# Patient Record
Sex: FEMALE | Race: BLACK OR AFRICAN AMERICAN | NOT HISPANIC OR LATINO | Employment: UNEMPLOYED | URBAN - METROPOLITAN AREA
[De-identification: names, ages, dates, MRNs, and addresses within clinical notes are randomized per-mention and may not be internally consistent; named-entity substitution may affect disease eponyms.]

---

## 2019-10-16 PROBLEM — I10 ESSENTIAL HYPERTENSION: Status: ACTIVE | Noted: 2019-10-16

## 2019-10-16 PROBLEM — E10.3521: Status: ACTIVE | Noted: 2019-10-16

## 2019-10-16 PROBLEM — Z21 ASYMPTOMATIC HIV INFECTION: Status: ACTIVE | Noted: 2019-10-16

## 2019-10-16 PROBLEM — H04.123 DRY EYE SYNDROME OF BOTH EYES: Status: ACTIVE | Noted: 2019-10-16

## 2019-11-08 PROBLEM — J81.0 ACUTE PULMONARY EDEMA: Status: ACTIVE | Noted: 2019-11-08

## 2019-11-08 PROBLEM — I77.0 ARTERIOVENOUS FISTULA: Status: ACTIVE | Noted: 2019-11-08

## 2019-11-08 PROBLEM — N18.6 END STAGE RENAL DISEASE: Status: ACTIVE | Noted: 2017-11-12

## 2019-11-08 PROBLEM — T80.219A CENTRAL LINE INFECTION: Status: ACTIVE | Noted: 2017-11-12

## 2019-11-08 PROBLEM — J18.9 CAP (COMMUNITY ACQUIRED PNEUMONIA): Status: ACTIVE | Noted: 2019-11-05

## 2020-02-10 PROBLEM — J81.0 ACUTE PULMONARY EDEMA: Status: RESOLVED | Noted: 2019-11-08 | Resolved: 2020-02-10

## 2020-04-01 PROBLEM — R87.610 ASCUS WITH POSITIVE HIGH RISK HPV CERVICAL: Status: ACTIVE | Noted: 2019-12-18

## 2020-04-01 PROBLEM — R87.810 ASCUS WITH POSITIVE HIGH RISK HPV CERVICAL: Status: ACTIVE | Noted: 2019-12-18

## 2020-07-29 PROBLEM — E11.69 TYPE 2 DIABETES MELLITUS WITH OTHER SPECIFIED COMPLICATION: Status: ACTIVE | Noted: 2020-07-29

## 2020-07-29 PROBLEM — Z99.2 HEMODIALYSIS PATIENT: Status: ACTIVE | Noted: 2020-07-29

## 2020-07-29 PROBLEM — H54.62 VISION LOSS OF LEFT EYE: Status: ACTIVE | Noted: 2020-07-29

## 2020-07-29 PROBLEM — B20 HIV INFECTION: Status: ACTIVE | Noted: 2017-11-12

## 2020-07-29 PROBLEM — Z12.4 PAP SMEAR FOR CERVICAL CANCER SCREENING: Status: ACTIVE | Noted: 2020-07-29

## 2020-07-29 PROBLEM — Z21 HIV INFECTION: Status: ACTIVE | Noted: 2017-11-12

## 2020-07-29 PROBLEM — F32.A DEPRESSION: Status: ACTIVE | Noted: 2020-07-29

## 2020-09-14 PROBLEM — T80.219A CENTRAL LINE INFECTION: Status: RESOLVED | Noted: 2017-11-12 | Resolved: 2020-09-14

## 2020-09-14 PROBLEM — Z12.4 PAP SMEAR FOR CERVICAL CANCER SCREENING: Status: RESOLVED | Noted: 2020-07-29 | Resolved: 2020-09-14

## 2020-09-14 PROBLEM — Z98.890 STATUS POST COLPOSCOPY: Status: ACTIVE | Noted: 2020-09-14

## 2020-11-11 PROBLEM — M25.571 RIGHT ANKLE PAIN: Status: ACTIVE | Noted: 2020-11-11

## 2020-11-11 PROBLEM — R06.02 SOB (SHORTNESS OF BREATH): Status: ACTIVE | Noted: 2020-11-11

## 2021-07-09 ENCOUNTER — PATIENT OUTREACH (OUTPATIENT)
Dept: ADMINISTRATIVE | Facility: HOSPITAL | Age: 31
End: 2021-07-09

## 2021-07-09 DIAGNOSIS — E11.69 TYPE 2 DIABETES MELLITUS WITH OTHER SPECIFIED COMPLICATION, UNSPECIFIED WHETHER LONG TERM INSULIN USE: Primary | ICD-10-CM

## 2021-07-20 PROBLEM — N18.6 TYPE 2 DIABETES MELLITUS WITH CHRONIC KIDNEY DISEASE ON CHRONIC DIALYSIS, WITH LONG-TERM CURRENT USE OF INSULIN: Status: ACTIVE | Noted: 2020-07-29

## 2021-07-20 PROBLEM — Z79.4 TYPE 2 DIABETES MELLITUS WITH CHRONIC KIDNEY DISEASE ON CHRONIC DIALYSIS, WITH LONG-TERM CURRENT USE OF INSULIN: Status: ACTIVE | Noted: 2020-07-29

## 2021-07-20 PROBLEM — Z99.2 TYPE 2 DIABETES MELLITUS WITH CHRONIC KIDNEY DISEASE ON CHRONIC DIALYSIS, WITH LONG-TERM CURRENT USE OF INSULIN: Status: ACTIVE | Noted: 2020-07-29

## 2021-07-20 PROBLEM — E11.42 DIABETIC POLYNEUROPATHY ASSOCIATED WITH TYPE 2 DIABETES MELLITUS: Status: ACTIVE | Noted: 2021-07-20

## 2021-07-20 PROBLEM — E11.22 TYPE 2 DIABETES MELLITUS WITH CHRONIC KIDNEY DISEASE ON CHRONIC DIALYSIS, WITH LONG-TERM CURRENT USE OF INSULIN: Status: ACTIVE | Noted: 2020-07-29

## 2021-12-14 PROBLEM — R87.810 ASCUS WITH POSITIVE HIGH RISK HPV CERVICAL: Status: RESOLVED | Noted: 2019-12-18 | Resolved: 2021-12-14

## 2021-12-14 PROBLEM — R87.612 LGSIL ON PAP SMEAR OF CERVIX: Status: ACTIVE | Noted: 2021-12-14

## 2021-12-14 PROBLEM — R87.610 ASCUS WITH POSITIVE HIGH RISK HPV CERVICAL: Status: RESOLVED | Noted: 2019-12-18 | Resolved: 2021-12-14

## 2021-12-18 PROBLEM — N87.0 CIN I (CERVICAL INTRAEPITHELIAL NEOPLASIA I): Status: ACTIVE | Noted: 2021-12-18

## 2022-01-06 PROBLEM — U07.1 PNEUMONIA DUE TO COVID-19 VIRUS: Status: ACTIVE | Noted: 2022-01-06

## 2022-01-06 PROBLEM — E87.5 HYPERKALEMIA: Status: ACTIVE | Noted: 2022-01-06

## 2022-01-06 PROBLEM — N25.81 SECONDARY HYPERPARATHYROIDISM: Status: ACTIVE | Noted: 2022-01-06

## 2022-01-06 PROBLEM — J12.82 PNEUMONIA DUE TO COVID-19 VIRUS: Status: ACTIVE | Noted: 2022-01-06

## 2022-01-06 PROBLEM — J12.82 PNEUMONIA DUE TO COVID-19 VIRUS: Status: RESOLVED | Noted: 2022-01-06 | Resolved: 2022-01-06

## 2022-01-06 PROBLEM — U07.1 PNEUMONIA DUE TO COVID-19 VIRUS: Status: RESOLVED | Noted: 2022-01-06 | Resolved: 2022-01-06

## 2022-01-06 PROBLEM — I16.0 HYPERTENSIVE URGENCY: Status: ACTIVE | Noted: 2022-01-06

## 2022-01-06 PROBLEM — J96.01 ACUTE HYPOXEMIC RESPIRATORY FAILURE: Status: ACTIVE | Noted: 2022-01-06

## 2022-01-06 PROBLEM — E87.1 HYPONATREMIA: Status: ACTIVE | Noted: 2022-01-06

## 2022-01-06 PROBLEM — E66.09 CLASS 1 OBESITY DUE TO EXCESS CALORIES WITH SERIOUS COMORBIDITY AND BODY MASS INDEX (BMI) OF 32.0 TO 32.9 IN ADULT: Status: ACTIVE | Noted: 2022-01-06

## 2022-01-06 PROBLEM — E66.811 CLASS 1 OBESITY DUE TO EXCESS CALORIES WITH SERIOUS COMORBIDITY AND BODY MASS INDEX (BMI) OF 32.0 TO 32.9 IN ADULT: Status: ACTIVE | Noted: 2022-01-06

## 2022-01-08 PROBLEM — M54.9 BACK PAIN: Status: ACTIVE | Noted: 2022-01-08

## 2022-01-10 ENCOUNTER — PATIENT MESSAGE (OUTPATIENT)
Dept: ADMINISTRATIVE | Facility: CLINIC | Age: 32
End: 2022-01-10

## 2022-01-11 ENCOUNTER — NURSE TRIAGE (OUTPATIENT)
Dept: ADMINISTRATIVE | Facility: CLINIC | Age: 32
End: 2022-01-11

## 2022-01-11 ENCOUNTER — TELEPHONE (OUTPATIENT)
Dept: ADMINISTRATIVE | Facility: CLINIC | Age: 32
End: 2022-01-11

## 2022-01-11 ENCOUNTER — PATIENT MESSAGE (OUTPATIENT)
Dept: ADMINISTRATIVE | Facility: OTHER | Age: 32
End: 2022-01-11

## 2022-01-11 NOTE — TELEPHONE ENCOUNTER
Per Covid Surveillance Program, pt escalated for abnormal symptoms; SOB 2/5 at rest.  SOB rated 0/5 with activity so she may have entered the readings incorrectly.  She states that she did enter it incorrectly.  She reports that she has been coughing for longer than 3 weeks.  Denies CP and fever.  SOB 2/5 with activity.  Per protocol, care advised is JIMMY will callback.  Warm handoff to JIMMY for further management.  ANABELA Baptiste PA-C notified via SecureChat.  Instructed to call OOC for worsening/questions/concerns.  VU.    Reason for Disposition   Cough present > 3 weeks    Additional Information   Negative: SEVERE difficulty breathing (e.g., struggling for each breath, speaks in single words)   Negative: Difficult to awaken or acting confused (e.g., disoriented, slurred speech)   Negative: Bluish (or gray) lips or face now   Negative: Shock suspected (e.g., cold/pale/clammy skin, too weak to stand, low BP, rapid pulse)   Negative: Sounds like a life-threatening emergency to the triager   Negative: SEVERE or constant chest pain or pressure (Exception: mild central chest pain, present only when coughing)   Negative: MODERATE difficulty breathing (e.g., speaks in phrases, SOB even at rest, pulse 100-120)   Negative: Headache and stiff neck (can't touch chin to chest)   Negative: MILD difficulty breathing (e.g., minimal/no SOB at rest, SOB with walking, pulse <100)   Negative: Chest pain or pressure   Negative: Patient sounds very sick or weak to the triager   Negative: Fever > 103 F (39.4 C)   Negative: [1] Fever > 101 F (38.3 C) AND [2] over 60 years of age   Negative: [1] Fever > 100.0 F (37.8 C) AND [2] bedridden (e.g., nursing home patient, CVA, chronic illness, recovering from surgery)   Negative: HIGH RISK for severe COVID complications (e.g., age > 64 years, obesity with BMI > 25, pregnant, chronic lung disease or other chronic medical condition) (Exception: Already seen by PCP and no new or  worsening symptoms.)   Negative: [1] HIGH RISK patient AND [2] influenza is widespread in the community AND [3] ONE OR MORE respiratory symptoms: cough, sore throat, runny or stuffy nose   Negative: [1] HIGH RISK patient AND [2] influenza exposure within the last 7 days AND [3] ONE OR MORE respiratory symptoms: cough, sore throat, runny or stuffy nose   Negative: [1] COVID-19 infection suspected by caller or triager AND [2] mild symptoms (cough, fever, or others) AND [3] negative COVID-19 rapid test   Negative: Fever present > 3 days (72 hours)   Negative: [1] Fever returns after gone for over 24 hours AND [2] symptoms worse or not improved   Negative: [1] Continuous (nonstop) coughing interferes with work or school AND [2] no improvement using cough treatment per protocol    Protocols used: CORONAVIRUS (COVID-19) DIAGNOSED OR [id number]

## 2022-01-12 ENCOUNTER — PATIENT MESSAGE (OUTPATIENT)
Dept: ADMINISTRATIVE | Facility: OTHER | Age: 32
End: 2022-01-12

## 2022-01-13 ENCOUNTER — PATIENT MESSAGE (OUTPATIENT)
Dept: ADMINISTRATIVE | Facility: OTHER | Age: 32
End: 2022-01-13

## 2022-01-13 ENCOUNTER — TELEPHONE (OUTPATIENT)
Dept: HOME HEALTH SERVICES | Facility: CLINIC | Age: 32
End: 2022-01-13

## 2022-01-13 ENCOUNTER — PATIENT MESSAGE (OUTPATIENT)
Dept: ADMINISTRATIVE | Facility: CLINIC | Age: 32
End: 2022-01-13

## 2022-01-13 ENCOUNTER — NURSE TRIAGE (OUTPATIENT)
Dept: ADMINISTRATIVE | Facility: CLINIC | Age: 32
End: 2022-01-13

## 2022-01-13 NOTE — TELEPHONE ENCOUNTER
Pt escalated due to reporting o2 sat of 94%, upon retake o2 96% level noted to be, pt also reported 3/5 SOB with activity Reports having back pain states use of tylenol, and lidocaine patch with no relief. Li Lebron NP aware, and will call pt.    Reason for Disposition   MILD difficulty breathing (e.g., minimal/no SOB at rest, SOB with walking, pulse <100)    Additional Information   Negative: SEVERE difficulty breathing (e.g., struggling for each breath, speaks in single words)   Negative: Bluish (or gray) lips or face now   Negative: Difficult to awaken or acting confused (e.g., disoriented, slurred speech)   Negative: Shock suspected (e.g., cold/pale/clammy skin, too weak to stand, low BP, rapid pulse)   Negative: Sounds like a life-threatening emergency to the triager   Negative: SEVERE or constant chest pain or pressure (Exception: mild central chest pain, present only when coughing)   Negative: MODERATE difficulty breathing (e.g., speaks in phrases, SOB even at rest, pulse 100-120)   Negative: Headache and stiff neck (can't touch chin to chest)    Protocols used: CORONAVIRUS (COVID-19) DIAGNOSED OR [id number]

## 2022-01-13 NOTE — TELEPHONE ENCOUNTER
"Called patient due to RN escalation in COVID Surveillance program. Pt escalated due to "reporting o2 sat of 94%, upon retake o2 96% level noted to be, pt also reported 3/5 SOB with activity. Reports dizziness this morning as well. States having back pain states use of tylenol, and lidocaine patch with no relief."    Patient location: [place], [place]    Vitals: Sp02 : 94%. P: 93. Temp: NA  Ambulatory Sp02 on the phone (if applicable): 96%    31 y.o. female with pertinent PMHx ESRD on dialysis, DM2, HTN, grade 1 cervical neoplasia s/p colopscopy, HIV  on day 13 of Covid symptoms. Positive Covid screen on 1/1/22 and 1/6/22. CXR on 1/6/22. Home oxygen: no. COVID-19 Hospitalization History: 1/8-1/10/22. Received antibody infusion: no. Fully vaccinated: no. Remdesivir treatment day: 1/7/22 and 1/11/22. SpO2 goal on hospital discharge: 93%.    HPI: Feeling weaker than usual today. Has not been drinking fluids because she is on dialysis and limits PO liquids. She has a dialysis appt in 1 hour.     ROS: Denies worsening cough, light headedness, fever, chills, diaphoresis, chest pain, abdominal pain, emesis, diarrhea or further symptoms.     Assessment: Vitals appear WNL. During phone call, patient appears alert and oriented. Able to speak in full sentences without difficulty. No audible wheezing heard during call.     Plan: Go to dialysis as scheduled. If weakness worsens. Go to ER. She agrees.     Reviewed with patient the reasons for seeking emergency care. Pt aware that if Sp02 <94% or if they have any worsening symptoms, they need to go to the emergency department. If they are having a medical emergency, they will call 911. Otherwise, patient will continue to submit data as scheduled. Reviewed importance of wearing mask if self or family members leave the house.     Advised next steps: Continue care at home, continue scheduled dialysis treatment.   "

## 2022-01-14 ENCOUNTER — PATIENT MESSAGE (OUTPATIENT)
Dept: ADMINISTRATIVE | Facility: OTHER | Age: 32
End: 2022-01-14

## 2022-01-15 ENCOUNTER — PATIENT MESSAGE (OUTPATIENT)
Dept: ADMINISTRATIVE | Facility: OTHER | Age: 32
End: 2022-01-15

## 2022-01-15 ENCOUNTER — PATIENT MESSAGE (OUTPATIENT)
Dept: ADMINISTRATIVE | Facility: CLINIC | Age: 32
End: 2022-01-15

## 2022-01-16 ENCOUNTER — PATIENT MESSAGE (OUTPATIENT)
Dept: ADMINISTRATIVE | Facility: OTHER | Age: 32
End: 2022-01-16

## 2022-01-17 ENCOUNTER — PATIENT MESSAGE (OUTPATIENT)
Dept: ADMINISTRATIVE | Facility: OTHER | Age: 32
End: 2022-01-17

## 2022-01-18 ENCOUNTER — PATIENT MESSAGE (OUTPATIENT)
Dept: ADMINISTRATIVE | Facility: OTHER | Age: 32
End: 2022-01-18

## 2022-01-18 ENCOUNTER — PATIENT MESSAGE (OUTPATIENT)
Dept: ADMINISTRATIVE | Facility: CLINIC | Age: 32
End: 2022-01-18

## 2022-01-19 ENCOUNTER — PATIENT MESSAGE (OUTPATIENT)
Dept: ADMINISTRATIVE | Facility: OTHER | Age: 32
End: 2022-01-19

## 2022-01-20 ENCOUNTER — PATIENT MESSAGE (OUTPATIENT)
Dept: ADMINISTRATIVE | Facility: OTHER | Age: 32
End: 2022-01-20

## 2022-01-21 ENCOUNTER — NURSE TRIAGE (OUTPATIENT)
Dept: ADMINISTRATIVE | Facility: CLINIC | Age: 32
End: 2022-01-21

## 2022-01-21 ENCOUNTER — PATIENT MESSAGE (OUTPATIENT)
Dept: ADMINISTRATIVE | Facility: OTHER | Age: 32
End: 2022-01-21

## 2022-01-21 ENCOUNTER — PATIENT MESSAGE (OUTPATIENT)
Dept: ADMINISTRATIVE | Facility: CLINIC | Age: 32
End: 2022-01-21

## 2022-01-21 NOTE — TELEPHONE ENCOUNTER
Received medium Priority Escalation via COVID Surveillance Program. Patient submitted the following abnormal data 2/5 SOB at rest.  Patient called. Patient reports the following her symptoms are not CV related - states they are not any worse- states she is due for dialysis tomorrow at 4am and that it is her norm to get some SOB before her treatments are due (3 days a week- gets some SOB M,W,F cause her treatments are Tu, TH, SAT). States CV symptoms are doing well and that she retested and is waiting for result. Speaking in complete sentences without difficulty. No audible wheezes noted. Advised to call back with concerns or worsening symptoms. Verbalized understanding.     Reason for Disposition   Information only question and nurse able to answer    Additional Information   Negative: Nursing judgment   Negative: Nursing judgment   Negative: Nursing judgment   Negative: Nursing judgment    Protocols used: INFORMATION ONLY CALL - NO TRIAGE-A-OH

## 2022-01-22 ENCOUNTER — PATIENT MESSAGE (OUTPATIENT)
Dept: ADMINISTRATIVE | Facility: OTHER | Age: 32
End: 2022-01-22

## 2022-01-23 ENCOUNTER — PATIENT MESSAGE (OUTPATIENT)
Dept: ADMINISTRATIVE | Facility: CLINIC | Age: 32
End: 2022-01-23

## 2022-01-24 ENCOUNTER — PATIENT MESSAGE (OUTPATIENT)
Dept: ADMINISTRATIVE | Facility: CLINIC | Age: 32
End: 2022-01-24

## 2022-01-24 PROBLEM — R10.9 FLANK PAIN: Status: ACTIVE | Noted: 2022-01-24

## 2022-01-25 PROBLEM — R06.02 SHORTNESS OF BREATH: Status: ACTIVE | Noted: 2022-01-25

## 2023-01-05 ENCOUNTER — TELEPHONE (OUTPATIENT)
Dept: TRANSPLANT | Facility: CLINIC | Age: 33
End: 2023-01-05

## 2023-01-06 ENCOUNTER — TELEPHONE (OUTPATIENT)
Dept: TRANSPLANT | Facility: CLINIC | Age: 33
End: 2023-01-06

## 2023-01-06 ENCOUNTER — DOCUMENTATION ONLY (OUTPATIENT)
Dept: TRANSPLANT | Facility: CLINIC | Age: 33
End: 2023-01-06

## 2023-01-06 NOTE — TELEPHONE ENCOUNTER
Contacted patient to review initial intake information. Patient reports the followin. Can you walk up a flight of stairs without getting short of breath or stopping? Yes   2. Can you walk one block without getting short of breath or having to stop? No   3. Do you use oxygen? No   4. Do you use a cane, walker, or wheel chair to assist in mobility? No   5. Have you been hospitalized or had recent surgery in the last 6 months? No    A. Stroke   B. Heart surgery or heart catheterization   C. Broken bone  6. Do you have any cuts, open sores (ulcers), or wounds anywhere on your body? No   7. Do you go to dialysis? Yes What days? T,T,S  8. Preferred appointment day? Weds  9. Caregiver? Boyfriend     Patient is aware the next steps will include completing records and compliance verification. Patient is aware once provider review and insurance authorization is received we will contact patient to schedule initial visit. Patient is aware that initial visit will begin prior to / at 7 am and will conclude at approximately 3 pm on date of appointment. All questions answered at this time.

## 2023-02-02 ENCOUNTER — TELEPHONE (OUTPATIENT)
Dept: TRANSPLANT | Facility: CLINIC | Age: 33
End: 2023-02-02

## 2023-02-02 NOTE — TELEPHONE ENCOUNTER
Returned call, reviewed denial letter with Sydnee. Sydnee verbalized understanding.     ----- Message from Asya Israel sent at 2/2/2023  1:40 PM CST -----  Regarding: status decline info  The KRISTY Randhawa  called from [facility]  asking to get more info regarding the patients declined status for transplant    No further information provided      Patient can be contacted @# [phone] ext [phone] (Sydnee)

## 2023-02-03 PROBLEM — Z78.9 PROBLEM WITH VASCULAR ACCESS: Status: ACTIVE | Noted: 2023-02-03

## 2023-04-26 PROBLEM — M72.6 NECROTIZING FASCIITIS: Status: ACTIVE | Noted: 2023-04-26

## 2023-04-26 PROBLEM — R00.0 TACHYCARDIA: Status: ACTIVE | Noted: 2023-04-26

## 2023-04-26 PROBLEM — E11.9 TYPE 2 DIABETES MELLITUS WITHOUT COMPLICATION, WITHOUT LONG-TERM CURRENT USE OF INSULIN: Status: ACTIVE | Noted: 2020-07-29

## 2023-04-27 PROBLEM — R65.20 SEPSIS WITH ACUTE ORGAN DYSFUNCTION: Status: ACTIVE | Noted: 2023-04-27

## 2023-04-27 PROBLEM — I95.9 HYPOTENSION: Status: ACTIVE | Noted: 2023-04-27

## 2023-04-27 PROBLEM — E87.6 HYPOKALEMIA: Status: ACTIVE | Noted: 2023-04-27

## 2023-04-27 PROBLEM — E87.29 HIGH ANION GAP METABOLIC ACIDOSIS: Status: ACTIVE | Noted: 2023-04-27

## 2023-04-27 PROBLEM — R50.9 FEVER: Status: ACTIVE | Noted: 2023-04-27

## 2023-04-27 PROBLEM — M79.89 RIGHT LEG SWELLING: Status: ACTIVE | Noted: 2023-04-27

## 2023-04-27 PROBLEM — A41.9 SEPSIS: Status: ACTIVE | Noted: 2023-04-27

## 2023-04-29 PROBLEM — A49.01 STAPHYLOCOCCUS AUREUS INFECTION: Status: ACTIVE | Noted: 2023-04-29

## 2023-04-30 PROBLEM — B95.61 MSSA BACTEREMIA: Status: ACTIVE | Noted: 2023-04-30

## 2023-04-30 PROBLEM — R78.81 MSSA BACTEREMIA: Status: ACTIVE | Noted: 2023-04-30

## 2023-05-01 PROBLEM — E87.6 HYPOKALEMIA: Status: RESOLVED | Noted: 2023-04-27 | Resolved: 2023-05-01

## 2023-05-01 PROBLEM — R00.0 SINUS TACHYCARDIA: Status: RESOLVED | Noted: 2023-04-26 | Resolved: 2023-05-01

## 2023-05-01 PROBLEM — A41.9 SEPSIS: Status: ACTIVE | Noted: 2023-05-01

## 2023-05-01 PROBLEM — E87.1 HYPONATREMIA: Status: RESOLVED | Noted: 2022-01-06 | Resolved: 2023-05-01

## 2023-05-01 PROBLEM — A41.01 SEPSIS DUE TO METHICILLIN SUSCEPTIBLE STAPHYLOCOCCUS AUREUS (MSSA) WITHOUT ACUTE ORGAN DYSFUNCTION: Status: ACTIVE | Noted: 2023-04-27

## 2023-05-01 PROBLEM — I95.9 HYPOTENSION: Status: RESOLVED | Noted: 2023-04-27 | Resolved: 2023-05-01

## 2023-05-01 PROBLEM — R50.9 FEVER: Status: RESOLVED | Noted: 2023-04-27 | Resolved: 2023-05-01

## 2023-05-03 PROBLEM — E11.22 TYPE 2 DIABETES MELLITUS WITH CHRONIC KIDNEY DISEASE ON CHRONIC DIALYSIS, WITH LONG-TERM CURRENT USE OF INSULIN: Status: ACTIVE | Noted: 2023-05-03

## 2023-05-03 PROBLEM — A41.01 SEPSIS DUE TO METHICILLIN SUSCEPTIBLE STAPHYLOCOCCUS AUREUS (MSSA) WITHOUT ACUTE ORGAN DYSFUNCTION: Status: RESOLVED | Noted: 2023-04-27 | Resolved: 2023-05-03

## 2023-05-03 PROBLEM — N18.6 TYPE 2 DIABETES MELLITUS WITH CHRONIC KIDNEY DISEASE ON CHRONIC DIALYSIS, WITH LONG-TERM CURRENT USE OF INSULIN: Status: ACTIVE | Noted: 2023-05-03

## 2023-05-03 PROBLEM — Z79.4 TYPE 2 DIABETES MELLITUS WITH CHRONIC KIDNEY DISEASE ON CHRONIC DIALYSIS, WITH LONG-TERM CURRENT USE OF INSULIN: Status: ACTIVE | Noted: 2023-05-03

## 2023-05-03 PROBLEM — Z99.2 TYPE 2 DIABETES MELLITUS WITH CHRONIC KIDNEY DISEASE ON CHRONIC DIALYSIS, WITH LONG-TERM CURRENT USE OF INSULIN: Status: ACTIVE | Noted: 2023-05-03

## 2023-05-03 PROBLEM — A41.9 SEPSIS: Status: RESOLVED | Noted: 2023-05-01 | Resolved: 2023-05-03

## 2023-05-03 PROBLEM — R00.0 TACHYCARDIA: Status: RESOLVED | Noted: 2023-04-26 | Resolved: 2023-05-03

## 2023-05-03 PROBLEM — E87.29 HIGH ANION GAP METABOLIC ACIDOSIS: Status: RESOLVED | Noted: 2023-04-27 | Resolved: 2023-05-03

## 2023-05-05 PROBLEM — I46.9 CARDIAC ARREST: Status: ACTIVE | Noted: 2023-05-05

## 2023-05-11 PROBLEM — T40.601A OPIATE OVERDOSE: Status: ACTIVE | Noted: 2023-05-11

## 2023-05-11 PROBLEM — R41.82 ALTERED MENTAL STATUS: Status: ACTIVE | Noted: 2023-05-11

## 2023-05-11 PROBLEM — G93.40 ACUTE ENCEPHALOPATHY: Status: ACTIVE | Noted: 2023-05-11

## 2023-05-11 PROBLEM — N39.0 UTI (URINARY TRACT INFECTION): Status: ACTIVE | Noted: 2023-05-11

## 2023-05-12 PROBLEM — Z79.899 POLYPHARMACY: Status: ACTIVE | Noted: 2023-05-12

## 2023-05-16 ENCOUNTER — PATIENT OUTREACH (OUTPATIENT)
Dept: ADMINISTRATIVE | Facility: CLINIC | Age: 33
End: 2023-05-16

## 2023-08-14 PROBLEM — N39.0 UTI (URINARY TRACT INFECTION): Status: RESOLVED | Noted: 2023-05-11 | Resolved: 2023-08-14

## 2023-11-30 ENCOUNTER — TELEPHONE (OUTPATIENT)
Dept: TRANSPLANT | Facility: CLINIC | Age: 33
End: 2023-11-30
Payer: MEDICAID

## 2023-12-05 ENCOUNTER — TELEPHONE (OUTPATIENT)
Dept: TRANSPLANT | Facility: CLINIC | Age: 33
End: 2023-12-05
Payer: MEDICAID

## 2023-12-20 ENCOUNTER — TELEPHONE (OUTPATIENT)
Dept: TRANSPLANT | Facility: CLINIC | Age: 33
End: 2023-12-20
Payer: MEDICAID

## 2023-12-21 ENCOUNTER — TELEPHONE (OUTPATIENT)
Dept: TRANSPLANT | Facility: CLINIC | Age: 33
End: 2023-12-21
Payer: MEDICAID

## 2023-12-26 DIAGNOSIS — Z76.82 ORGAN TRANSPLANT CANDIDATE: Primary | ICD-10-CM

## 2024-01-31 ENCOUNTER — TELEPHONE (OUTPATIENT)
Dept: TRANSPLANT | Facility: CLINIC | Age: 34
End: 2024-01-31
Payer: MEDICAID

## 2024-01-31 ENCOUNTER — HOSPITAL ENCOUNTER (OUTPATIENT)
Dept: RADIOLOGY | Facility: HOSPITAL | Age: 34
Discharge: HOME | End: 2024-01-31
Payer: MEDICAID

## 2024-01-31 ENCOUNTER — OFFICE VISIT (OUTPATIENT)
Dept: TRANSPLANT | Facility: CLINIC | Age: 34
End: 2024-01-31
Payer: MEDICAID

## 2024-01-31 ENCOUNTER — HOSPITAL ENCOUNTER (OUTPATIENT)
Dept: RADIOLOGY | Facility: HOSPITAL | Age: 34
Discharge: HOME | End: 2024-01-31
Attending: NURSE PRACTITIONER
Payer: MEDICAID

## 2024-01-31 VITALS
RESPIRATION RATE: 18 BRPM | DIASTOLIC BLOOD PRESSURE: 93 MMHG | HEART RATE: 102 BPM | OXYGEN SATURATION: 99 % | BODY MASS INDEX: 33.28 KG/M2 | TEMPERATURE: 97 F | HEIGHT: 63 IN | WEIGHT: 187.81 LBS | SYSTOLIC BLOOD PRESSURE: 174 MMHG

## 2024-01-31 DIAGNOSIS — Z79.4 DIABETES MELLITUS DUE TO UNDERLYING CONDITION WITH CHRONIC KIDNEY DISEASE ON CHRONIC DIALYSIS, WITH LONG-TERM CURRENT USE OF INSULIN: ICD-10-CM

## 2024-01-31 DIAGNOSIS — E66.09 CLASS 1 OBESITY DUE TO EXCESS CALORIES WITH SERIOUS COMORBIDITY AND BODY MASS INDEX (BMI) OF 32.0 TO 32.9 IN ADULT: ICD-10-CM

## 2024-01-31 DIAGNOSIS — N87.0 CIN I (CERVICAL INTRAEPITHELIAL NEOPLASIA I): ICD-10-CM

## 2024-01-31 DIAGNOSIS — N18.6 ANEMIA DUE TO CHRONIC KIDNEY DISEASE, ON CHRONIC DIALYSIS: ICD-10-CM

## 2024-01-31 DIAGNOSIS — I12.0 TYPE 1 DM WITH HYPERTENSION AND ESRD ON DIALYSIS: ICD-10-CM

## 2024-01-31 DIAGNOSIS — N18.6 DIABETES MELLITUS DUE TO UNDERLYING CONDITION WITH CHRONIC KIDNEY DISEASE ON CHRONIC DIALYSIS, WITH LONG-TERM CURRENT USE OF INSULIN: ICD-10-CM

## 2024-01-31 DIAGNOSIS — Z99.2 ESRD ON HEMODIALYSIS: ICD-10-CM

## 2024-01-31 DIAGNOSIS — N18.6 HYPERPARATHYROIDISM DUE TO ESRD ON DIALYSIS: ICD-10-CM

## 2024-01-31 DIAGNOSIS — E08.22 DIABETES MELLITUS DUE TO UNDERLYING CONDITION WITH CHRONIC KIDNEY DISEASE ON CHRONIC DIALYSIS, WITH LONG-TERM CURRENT USE OF INSULIN: ICD-10-CM

## 2024-01-31 DIAGNOSIS — Z76.82 ORGAN TRANSPLANT CANDIDATE: ICD-10-CM

## 2024-01-31 DIAGNOSIS — D63.1 ANEMIA DUE TO CHRONIC KIDNEY DISEASE, ON CHRONIC DIALYSIS: ICD-10-CM

## 2024-01-31 DIAGNOSIS — I73.9 PVD (PERIPHERAL VASCULAR DISEASE): ICD-10-CM

## 2024-01-31 DIAGNOSIS — N25.81 HYPERPARATHYROIDISM DUE TO ESRD ON DIALYSIS: ICD-10-CM

## 2024-01-31 DIAGNOSIS — B20 CURRENTLY ASYMPTOMATIC HIV INFECTION, WITH HISTORY OF HIV-RELATED ILLNESS: ICD-10-CM

## 2024-01-31 DIAGNOSIS — Z99.2 TYPE 1 DM WITH HYPERTENSION AND ESRD ON DIALYSIS: ICD-10-CM

## 2024-01-31 DIAGNOSIS — Z01.818 PRE-TRANSPLANT EVALUATION FOR CHRONIC KIDNEY DISEASE: Primary | ICD-10-CM

## 2024-01-31 DIAGNOSIS — E10.22 TYPE 1 DM WITH HYPERTENSION AND ESRD ON DIALYSIS: ICD-10-CM

## 2024-01-31 DIAGNOSIS — N18.6 TYPE 1 DM WITH HYPERTENSION AND ESRD ON DIALYSIS: ICD-10-CM

## 2024-01-31 DIAGNOSIS — Z99.2 HYPERPARATHYROIDISM DUE TO ESRD ON DIALYSIS: ICD-10-CM

## 2024-01-31 DIAGNOSIS — Z99.2 DIABETES MELLITUS DUE TO UNDERLYING CONDITION WITH CHRONIC KIDNEY DISEASE ON CHRONIC DIALYSIS, WITH LONG-TERM CURRENT USE OF INSULIN: ICD-10-CM

## 2024-01-31 DIAGNOSIS — Z99.2 ANEMIA DUE TO CHRONIC KIDNEY DISEASE, ON CHRONIC DIALYSIS: ICD-10-CM

## 2024-01-31 DIAGNOSIS — N18.6 ESRD ON HEMODIALYSIS: ICD-10-CM

## 2024-01-31 PROBLEM — M79.89 RIGHT LEG SWELLING: Status: RESOLVED | Noted: 2023-04-27 | Resolved: 2024-01-31

## 2024-01-31 PROBLEM — E11.9 TYPE 2 DIABETES MELLITUS WITHOUT COMPLICATION, WITHOUT LONG-TERM CURRENT USE OF INSULIN: Status: RESOLVED | Noted: 2020-07-29 | Resolved: 2024-01-31

## 2024-01-31 PROBLEM — E11.22: Status: ACTIVE | Noted: 2024-01-31

## 2024-01-31 PROBLEM — E10.9 TYPE 1 DIABETES MELLITUS: Status: ACTIVE | Noted: 2023-05-03

## 2024-01-31 PROBLEM — E10.42 DIABETIC POLYNEUROPATHY ASSOCIATED WITH TYPE 1 DIABETES MELLITUS: Status: ACTIVE | Noted: 2021-07-20

## 2024-01-31 PROCEDURE — 71046 X-RAY EXAM CHEST 2 VIEWS: CPT | Mod: TC,TXP

## 2024-01-31 PROCEDURE — 3008F BODY MASS INDEX DOCD: CPT | Mod: CPTII,TXP,, | Performed by: NURSE PRACTITIONER

## 2024-01-31 PROCEDURE — 99215 OFFICE O/P EST HI 40 MIN: CPT | Mod: PBBFAC,25,TXP | Performed by: NURSE PRACTITIONER

## 2024-01-31 PROCEDURE — 99205 OFFICE O/P NEW HI 60 MIN: CPT | Mod: S$PBB,TXP,, | Performed by: NURSE PRACTITIONER

## 2024-01-31 PROCEDURE — 3046F HEMOGLOBIN A1C LEVEL >9.0%: CPT | Mod: CPTII,TXP,, | Performed by: NURSE PRACTITIONER

## 2024-01-31 PROCEDURE — 76770 US EXAM ABDO BACK WALL COMP: CPT | Mod: 26,TXP,, | Performed by: INTERNAL MEDICINE

## 2024-01-31 PROCEDURE — 3080F DIAST BP >= 90 MM HG: CPT | Mod: CPTII,TXP,, | Performed by: NURSE PRACTITIONER

## 2024-01-31 PROCEDURE — 99204 OFFICE O/P NEW MOD 45 MIN: CPT | Mod: S$PBB,TXP,, | Performed by: TRANSPLANT SURGERY

## 2024-01-31 PROCEDURE — 93978 VASCULAR STUDY: CPT | Mod: TC,TXP

## 2024-01-31 PROCEDURE — 72170 X-RAY EXAM OF PELVIS: CPT | Mod: TC,TXP

## 2024-01-31 PROCEDURE — 3077F SYST BP >= 140 MM HG: CPT | Mod: CPTII,TXP,, | Performed by: NURSE PRACTITIONER

## 2024-01-31 PROCEDURE — 93978 VASCULAR STUDY: CPT | Mod: 26,TXP,, | Performed by: INTERNAL MEDICINE

## 2024-01-31 PROCEDURE — 76770 US EXAM ABDO BACK WALL COMP: CPT | Mod: TC,TXP

## 2024-01-31 PROCEDURE — 72170 X-RAY EXAM OF PELVIS: CPT | Mod: 26,TXP,, | Performed by: RADIOLOGY

## 2024-01-31 PROCEDURE — 1159F MED LIST DOCD IN RCRD: CPT | Mod: CPTII,TXP,, | Performed by: NURSE PRACTITIONER

## 2024-01-31 PROCEDURE — 99999 PR PBB SHADOW E&M-EST. PATIENT-LVL V: CPT | Mod: PBBFAC,TXP,, | Performed by: NURSE PRACTITIONER

## 2024-01-31 PROCEDURE — 71046 X-RAY EXAM CHEST 2 VIEWS: CPT | Mod: 26,TXP,, | Performed by: RADIOLOGY

## 2024-01-31 PROCEDURE — 1160F RVW MEDS BY RX/DR IN RCRD: CPT | Mod: CPTII,TXP,, | Performed by: NURSE PRACTITIONER

## 2024-01-31 RX ORDER — DEXTROMETHORPHAN HYDROBROMIDE, GUAIFENESIN 5; 100 MG/5ML; MG/5ML
650 LIQUID ORAL EVERY 8 HOURS PRN
COMMUNITY

## 2024-01-31 RX ORDER — LACTULOSE 10 G/15ML
10 SOLUTION ORAL; RECTAL DAILY PRN
COMMUNITY
Start: 2023-12-29

## 2024-01-31 RX ORDER — GABAPENTIN 100 MG/1
100 CAPSULE ORAL 3 TIMES DAILY
COMMUNITY
Start: 2024-01-05 | End: 2024-04-05 | Stop reason: ALTCHOICE

## 2024-01-31 NOTE — PROGRESS NOTES
MYCOPHENOLATE REMS PROGRAM:    I reviewed the mycophenolate REMS program with the patient.  Provided the mycophenolate REMS Guide to the patient.  Patient verbalized understanding and had the opportunity to ask questions.

## 2024-01-31 NOTE — LETTER
February 2, 2024        Link Clifford  [address] [facility] [address]  Phone: [phone]  Fax: [phone]             [facility] Transplant 1st Fl  [address]  [facility] [address]  Phone: [phone]   Patient: Gi To   MR Number: [id number]   YOB: 1990   Date of Visit: 1/31/2024       Dear Dr. Link Clifford    Thank you for referring Gi To to me for evaluation. Attached you will find relevant portions of my assessment and plan of care.    If you have questions, please do not hesitate to call me. I look forward to following Gi To along with you.    Sincerely,    Millicent Prather, NP    Enclosure    If you would like to receive this communication electronically, please contact [email or web address] or [phone] to request [facility] Link access.    [facility] Link is a tool which provides read-only access to select patient information with whom you have a relationship. Its easy to use and provides real time access to review your patients record including encounter summaries, notes, results, and demographic information.    If you feel you have received this communication in error or would no longer like to receive these types of communications, please e-mail [email or web address]

## 2024-01-31 NOTE — PROGRESS NOTES
"INITIAL PATIENT EDUCATION NOTE     Ms. Gi To was seen in pre-kidney transplant clinic for evaluation for kidney, kidney/pancreas or pancreas only transplant.  The patient attended an individual video education session that discussed/reviewed the following aspects of transplantation: evaluation including diagnostic and laboratory testing,(Chemistries, Hematology, Serologies including HIV and Hepatitis and HLA) required for transplantation and selection committee process, UNOS waitlist management/multiple listings, types of organs offered (KDPI < 85%, KDPI > 85%, PHS risk, DCD, HCV+, HIV+ for HIV+ recipients and enbloc/dual), financial aspects, surgical procedures, dietary instruction pre- and post-transplant, health maintenance pre- and post-transplant, post-transplant hospitalization and outpatient follow-up, potential to participate in a research protocol, and medication management and side effects.  A question and answer session was provided after the presentation.    The patient was seen by all members of the multi-disciplinary team to include: Nephrologist/JIMMY, Surgeon, , Transplant Coordinator, , Pharmacist and Dietician (if applicable).    The patient reviewed and signed all consents for evaluation which were witnessed and sent to scanning into the [facility] chart.    The patient was given an education book and plan for further evaluation based on her individual assessment.      The Patient was educated on OPTN policy change regarding race based eGFR. For Black or  Americans, this eGFR could have shown that their kidneys were working better than they were.    Because of this change, we are looking at everyone's record and assessing waiting time for people who are eligible. We will be reviewing your medical records and will notify you if you are eligible. We also encouraged patient to provide "span 20 labs" that are not in our electronic medical " records.    Reviewed program requirement for complete COVID vaccination with documentation prior to listing.  COVID education information reviewed with patient. Patient encouraged to be up to date on all vaccinations.     The patient was informed that the transplant team would manage immediate post op pain. If the patient requires long term pain management, they will need to have that pain management addressed by their PCP or previous provider who wrote for long term pain medicines.    The patient was encouraged to call with any questions or concerns.

## 2024-01-31 NOTE — PROGRESS NOTES
PHARM.D. PRE-TRANSPLANT NOTE:    This patient's medication therapy was evaluated as part of her pre-transplant evaluation.      The following general pharmacologic concerns were noted:   Biktarvy: tenofovir component may enhance the nephrotoxic effect of tacrolimus    The following concerns for post-operative pain management were noted:   Patient taking scheduled gabapentin TID - will most likely required restarting post-op    The following pharmacologic concerns related to HCV therapy were noted: N/A    This patient's medication profile was reviewed for considerations for DAA Hepatitis C therapy:    [x]  No current inducers of CYP 3A4 or PGP  [x]  No amiodarone on this patient's EMR profile in the last 24 months  [x]  No past or current atrial fibrillation on this patient's EMR profile       Current Outpatient Medications   Medication Sig Dispense Refill    acetaminophen (TYLENOL) 650 MG TbSR Take 650 mg by mouth every 8 (eight) hours as needed (pain).      xuiuyecpx-iqhtjzhd-kgukfjw ala (BIKTARVY) -25 mg (25 kg or greater) Take 1 tablet by mouth once daily. 90 tablet 6    blood sugar diagnostic Strp To check BG 3 times daily, to use with insurance preferred meter 200 each 0    blood-glucose meter kit To check BG 3 times daily, to use with insurance preferred meter 1 each 0    carvediloL (COREG) 6.25 MG tablet Take 12.5 mg by mouth 2 (two) times daily.      cloNIDine (CATAPRES) 0.1 MG tablet Take 1 tablet (0.1 mg total) by mouth 2 (two) times daily. 180 tablet 3    epoetin juvenal-epbx (RETACRIT) 3,000 unit/mL injection Inject 2 mLs (6,000 Units total) into the skin every Tues, Thurs, Sat. 10 each 0    gabapentin (NEURONTIN) 100 MG capsule Take 100 mg by mouth 3 (three) times daily.      HYDROcodone-acetaminophen (NORCO)  mg per tablet Take 1 tablet by mouth every 4 (four) hours as needed.      insulin glargine (LANTUS) 100 unit/mL injection Inject 15 Units into the skin 2 (two) times a day. 10 mL 3     "lactulose (CHRONULAC) 10 gram/15 mL solution Take 10 g by mouth daily as needed (constipation).      lancets Misc To check BG 3 times daily, to use with insurance preferred meter 200 each 0    LIDOcaine (LIDODERM) 5 % Place 1 patch onto the skin once daily. Remove & Discard patch within 12 hours or as directed by MD 15 patch 0    melatonin (MELATIN) 3 mg tablet Take 2 tablets (6 mg total) by mouth nightly as needed for Insomnia. 30 tablet 0    NIFEdipine (PROCARDIA-XL) 30 MG (OSM) 24 hr tablet Take 1 tablet (30 mg total) by mouth 2 (two) times a day. 180 tablet 3    ondansetron (ZOFRAN-ODT) 4 MG TbDL Take 1 tablet (4 mg total) by mouth daily as needed (nausea). 30 tablet 0    pen needle, diabetic (BD ULTRA-FINE SHORT PEN NEEDLE) 31 gauge x 5/16" Ndle 1 Needle by Misc.(Non-Drug; Combo Route) route once daily. 100 each 0    polyethylene glycol (GLYCOLAX) 17 gram/dose powder SMARTSI Gram(s) By Mouth Daily PRN      pregabalin (LYRICA) 50 MG capsule Take 1 capsule (50 mg total) by mouth every Tues, Th, Sat. After dialysis 12 capsule 11    sevelamer carbonate (RENVELA) 800 mg Tab Take 2 tablets (1,600 mg total) by mouth 3 (three) times daily with meals. 540 tablet 3     No current facility-administered medications for this visit.     I am available for consultation and can be contacted, as needed by the other members of the Transplant team.      "

## 2024-01-31 NOTE — TELEPHONE ENCOUNTER
Reviewed pt transplant labs.  Notified dialysis unit dietitian of the following abnormal labs via fax and requested their most recent nutrition note on this pt.  Once this note is received it will be scanned into pt's chart.     A1c >14.0  Glucose 403  Calcium 8.6  Albumin 3.0  Phos 6.4

## 2024-01-31 NOTE — PROGRESS NOTES
Transplant Nephrology  Kidney Transplant Recipient Evaluation    Referring Physician: Link Clifford  Current Nephrologist: Link Clifford    Subjective:   CC:  Initial evaluation of kidney transplant candidacy.    HPI:  Ms. To is a 33 y.o. year old Black or  female who has presented to be evaluated as a potential kidney transplant recipient.  She has ESRD secondary to diabetic nephropathy and HIV nephropathy . Kidney fx started to decline in 6/2017. Patient is currently on hemodialysis started on ? 2017 --will need to verify with dialysis center/general nephrology. Patient is dialyzing on TTS schedule.  Patient reports that she is tolerating dialysis well.. She has a LUE AV graft for dialysis access. 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 Runs errands, shops etc. Does not appear frail.    Donor: no     HIV  Diagnosed ~ 2016/2017  Follows with local ID   Meds: HAART  Stable: Reported viral load undetectable per PMH/on red chart  Opportunistic infections:   HX MSSA/bacteremia 4/2023-5/2023    GYN HX:   HX ABN PAP;   12/16/2021 JULIA I (cervical intraepithelial neoplasia I)   And ASCUS   Last PAP  --does not recall  and has not f/u with GYN    9/13/21 GYN:  ASCUS with high risk HPV s/p colposcopy on 8/2020 12/16/21 colposcopy   1/12/22 Pt message/EPIC   results of your colposcopy showed JULIA I which are precancerous cells. This means that instead of doing pap smears every 3-5 years  we will need to repeat your pap smear in one year to avoid progression to cervical cancer. You may also need another colposcopy following that pap smear if it remains abnormal. Please let me know if you would like me to reschedule your clinic appt to discuss this further.         Opiate overdose and Cardiac arrest --  5/11/2023 during hospitalization    5/12/23 /[facility]  Gi To is a 32 year old female with a PMHx of insulin dependent DM, HIV (on ART therapy), and ESRD (on dialysis T/TH/S) who presents to the ER from [facility] rehab due altered mental status. In the ER patient is only arousable to painful stimuli and is not able to provide history. Paper work from [facility] states that they had difficulty arousing her this afternoon. She had received 3 doses on gabapentin since last night and 4 doses of Norco 10. She was due to go to dialysis today. Patient was recently discharged from [facility] 05/09/23 due to Necrotizing fascitis and cardiac arrest. Patient underwent a Right BKA by ortho for her necrotizing fascitis. She was transferred to the MICU following episode of bradycardia HR 70s to 30s leading to cardiac arrest with asystole. Patient was intubated during the code. Ventilator settings were subsequently weaned and patient was extubated on 5/6. ID had recommended Cefazolin 2 gm intravenous after dialysis, with the last do being on 05/17/2023. And Flagyl PO TID with a end date of 05/17/2023.       Past Medical and Surgical History: Ms. To  has a past medical history of Anemia, ASCUS with positive high risk HPV cervical, CLABSI (central line-associated bloodstream infection), Constipation, Depression, Diabetes mellitus type I, Disorder of kidney and ureter, HIV infection, Hyperphosphatemia, Hypertension, Obesity, PVD (peripheral vascular disease), and Retinal detachment.  She has a past surgical history that includes AV fistula placement; Perm a cath; Angiography of arteriovenous shunt (Left,  "10/15/2021); Angiography of arteriovenous shunt (Left, 3/9/2022); Angiography of arteriovenous shunt (Left, 2/3/2023); Angiography of arteriovenous shunt (Left, 2/8/2023); Wound debridement (Right, 4/26/2023); pr place needle in vein (4/30/2023); and Below knee amputation of lower extremity (Right, 5/2/2023).    Past Social and Family History: Ms. To reports that she has never smoked. She has never used smokeless tobacco. She reports that she does not drink alcohol and does not use drugs. Her family history includes Diabetes in her brother, maternal aunt, maternal uncle, mother, and sister; Glaucoma in her maternal aunt and maternal uncle; Hypertension in her brother, maternal aunt, maternal uncle, mother, and sister; Kidney disease in her maternal uncle and sister; Retinal detachment in her sister.    Past Medical History:   Diagnosis Date    Anemia     ASCUS with positive high risk HPV cervical 12/18/2019    CLABSI (central line-associated bloodstream infection)     Constipation     Depression     Diabetes mellitus type I     Disorder of kidney and ureter     HIV infection     Hyperphosphatemia     Hypertension     Obesity     PVD (peripheral vascular disease)     Retinal detachment        Review of Systems   Constitutional:  Positive for fatigue and unexpected weight change.   Eyes:  Positive for visual disturbance.   Cardiovascular:  Positive for leg swelling.   Gastrointestinal:  Positive for constipation.   Genitourinary:  Positive for decreased urine volume (anuric).   Musculoskeletal:         Right BKA--wears a prosthesis    Allergic/Immunologic: Positive for immunocompromised state.   Neurological:         Polyneuropathy    Psychiatric/Behavioral:  Positive for sleep disturbance.        Objective:   Blood pressure (!) 174/93, pulse 102, temperature 97.3 °F (36.3 °C), temperature source Tympanic, resp. rate 18, height 5' 3.39" (1.61 m), weight 85.2 kg (187 lb 13.3 oz), SpO2 99 %.body mass index is " "32.87 kg/m².    Physical Exam  Constitutional:       Appearance: Normal appearance. She is well-developed.   HENT:      Head: Normocephalic.      Nose: Nose normal.   Eyes:      Conjunctiva/sclera: Conjunctivae normal.      Pupils: Pupils are equal, round, and reactive to light.   Cardiovascular:      Rate and Rhythm: Normal rate and regular rhythm.      Heart sounds: Normal heart sounds.   Pulmonary:      Effort: Pulmonary effort is normal.      Breath sounds: Normal breath sounds.   Abdominal:      General: Bowel sounds are normal.      Palpations: Abdomen is soft. There is no hepatomegaly or splenomegaly.   Musculoskeletal:        Arms:       Cervical back: Normal range of motion and neck supple.      Left lower leg: Edema (trace peripheral edema) present.        Legs:    Skin:     General: Skin is warm and dry.   Neurological:      Mental Status: She is alert and oriented to person, place, and time.   Psychiatric:         Behavior: Behavior normal.         Labs:  Lab Results   Component Value Date    WBC 10.83 01/31/2024    HGB 10.2 (L) 01/31/2024    HCT 31.7 (L) 01/31/2024     (L) 01/31/2024    K 4.1 01/31/2024    CL 91 (L) 01/31/2024    CO2 25 01/31/2024    BUN 42 (H) 01/31/2024    CREATININE 10.0 (H) 01/31/2024    EGFRNORACEVR 4.8 (A) 01/31/2024    CALCIUM 8.6 (L) 01/31/2024    PHOS 6.4 (H) 01/31/2024    MG 2.8 (H) 05/11/2023    ALBUMIN 3.0 (L) 01/31/2024    AST 10 01/31/2024    ALT 13 01/31/2024    .8 (H) 01/31/2024       Lab Results   Component Value Date    BILIRUBINUA 1+ (A) 05/10/2023    PROTEINUA 3+ (A) 05/10/2023    NITRITE Positive (A) 05/10/2023    RBCUA 20 (H) 05/10/2023    WBCUA >100 (H) 05/10/2023       No results found for: "HLAABCTYPE"    Labs were reviewed with the patient.    Assessment:     1. Pre-transplant evaluation for chronic kidney disease    2. ESRD on hemodialysis    3. JULIA I (cervical intraepithelial neoplasia I)    4. Anemia due to chronic kidney disease, on chronic " dialysis    5. Class 1 obesity due to excess calories with serious comorbidity and body mass index (BMI) of 32.0 to 32.9 in adult    6. Hyperparathyroidism due to ESRD on dialysis    7. Diabetes mellitus due to underlying condition with chronic kidney disease on chronic dialysis, with long-term current use of insulin    8. PVD (peripheral vascular disease)    9. Currently asymptomatic HIV infection, with history of HIV-related illness    10. Type 1 DM with hypertension and ESRD on dialysis        Plan:   GYN: UTD PAP and clearance and recs  c/o colposcopy showed JULIA I which are precancerous cells in 2021  DMI-insulin dependant: needs close f/u with endocrinology and discussed better glycemic control -- will need UTD A1c prior to committee presentation . next endo apt in 2/2024  Lab Results   Component Value Date    HGBA1C >14.0 (H) 01/31/2024     Cardiology clearance: HTN, PVD, Cardiac arrest 5/2/23  HX HIV, CD4 per guidelines, ID referral at [facility]    Body mass index is 32.87 kg/m².  Discussed weight loss for consideration for K/P candidacy     Transplant Candidacy:   Based on available information, Ms. To is a high-risk kidney transplant candidate d/t poorly controlled DM , PAD, and other co morbidities. .   Meets center eligibility for accepting HCV+ donor offer - Yes.  Patient educated on HCV+ donors. Gi is willing to accept HCV+ donor offer - Yes   Patient is a candidate for KDPI > 85 kidney donor offer - No d/t age  Final determination of transplant candidacy will be made once workup is complete and reviewed by the selection committee.    Patient advised that it is recommended that all transplant candidates, and their close contacts and household members receive Covid vaccination.    UNOS Patient Status  Functional Status: 60% - Requires occasional assistance but is able to care for needs     Millicent Prather NP

## 2024-01-31 NOTE — PROGRESS NOTES
Transplant Surgery  Kidney Transplant Recipient Evaluation    Referring Physician: Link Cilfford  Current Nephrologist: Link Clifford    Subjective:     Reason for Visit: evaluate transplant candidacy    History of Present Illness: Gi To is a 33 y.o. year old female undergoing transplant evaluation.    Dialysis History: Gi is on hemodialysis.      Transplant History: N/A    Etiology of Renal Disease: HIV Nephropathy (based on medical records from referral).    External provider notes reviewed: Yes    Review of Systems   Constitutional:  Negative for activity change and chills.   HENT:  Negative for congestion and sore throat.    Eyes:  Negative for discharge and redness.   Respiratory:  Negative for cough and shortness of breath.    Cardiovascular:  Negative for chest pain and palpitations.   Gastrointestinal:  Negative for nausea and vomiting.   Endocrine: Negative for polydipsia and polyuria.   Genitourinary:  Negative for dysuria and frequency.   Musculoskeletal:  Negative for arthralgias and gait problem.   Skin:  Negative for pallor and rash.   Neurological:  Negative for dizziness and light-headedness.   Hematological:  Negative for adenopathy. Does not bruise/bleed easily.   Psychiatric/Behavioral:  Negative for agitation and suicidal ideas.      Objective:     Physical Exam:  Constitutional:   Vitals reviewed: yes   Well-nourished and well-groomed: yes  Eyes:   Sclerae icteric: no   Extraocular movements intact: yes  GI:    Bowel sounds normal: yes   Tenderness: no    If yes, quadrant/location: not applicable   Palpable masses: no    If yes, quadrant/location: not applicable   Hepatosplenomegaly: no   Ascites: no   Hernia: no    If yes, type/location: not applicable   Surgical scars: no    If yes, type/location: not applicable  Resp:   Effort normal: yes   Breath sounds normal: yes    CV:   Regular rate and rhythm: yes   Heart sounds normal: yes   Femoral pulses normal:  yes   Extremities edematous: no  Skin:   Rashes or lesions present: no    If yes, describe:not applicable   Jaundice:: no    Musculoskeletal:   Gait normal: yes   Strength normal: yes  Psych:   Oriented to person, place, and time: yes   Affect and mood normal: yes    Additional comments:  Yusuf BKA from necrotizing fasciitis    Diagnostics:  The following labs have been reviewed: CBC  CMP  The following radiology images have been independently reviewed and interpreted:     Counseling: We provided Gi To with a group education session today.  We discussed kidney transplantation at length with her, including risks, potential complications, and alternatives in the management of her renal failure.  The discussion included complications related to anesthesia, bleeding, infection, primary nonfunction, and ATN.  I discussed the typical postoperative course, length of hospitalization, the need for long-term immunosuppression, and the need for long-term routine follow-up.  I discussed living-donor and -donor transplantation and the relative advantages and disadvantages of each.  I also discussed average waiting times for both living donation and  donation.  I discussed national and center-specific survival rates.  I also mentioned the potential benefit of multicenter listing to candidates listed with centers within more than one organ procurement organization.  All questions were answered.    Patient advised that it is recommended that all transplant candidates, and their close contacts and household members receive Covid vaccination.    Final determination of transplant candidacy will be made once evaluation is complete and reviewed by the Kidney & Kidney/Pancreas Selection Committee.    Coronavirus disease (COVID-19) caused by severe acute respiratory virus coronavirus 2 (SARS-C0V 2) is associated with increased mortality in solid organ transplant recipients (SOT) compared to non-transplant  patients. Vaccine responses to vaccination are depressed against SARS-CoV2 compared to normal individuals but improve with third vaccination doses. Vaccination prior to SOT provides both the best opportunity for transplant candidates to develop protective immunity and to reduce the risk of serious COVID19 infections post transplantation. Organ transplant candidates at [facility] will be required to receive SARS-CoV-2 vaccination prior to being listed with a an active status, whenever possible. Exceptions will be made for disability related reasons or for sincerely held Hoahaoism beliefs.          Transplant Surgery - Candidacy   Assessment/Plan:   Gi To has end stage renal disease (ESRD) on dialysis. I see no surgical contraindication to placing a kidney transplant. Based on available information, Gi To is a suitable kidney transplant candidate from a surgical perspective, but has increased medical risk. Consider CT scan for iliac vascular disease risk assessment given prior complications from PVD..     Additional testing to be completed according to the Written Order Guidelines for Adult Pre-kidney and Pancreas Transplant Evaluation (KI-02).  Interpretation of tests and discussion of patient management involves all members of the multidisciplinary transplant team.    Jesus Rubio MD
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
## 2024-02-06 ENCOUNTER — TELEPHONE (OUTPATIENT)
Dept: TRANSPLANT | Facility: CLINIC | Age: 34
End: 2024-02-06
Payer: MEDICAID

## 2024-02-06 ENCOUNTER — SOCIAL WORK (OUTPATIENT)
Dept: TRANSPLANT | Facility: CLINIC | Age: 34
End: 2024-02-06
Payer: MEDICAID

## 2024-02-06 NOTE — PROGRESS NOTES
"  Follow up transplant transportation plan and transplant financial plan for travel, meals and lodging and psychiatric clearance.    Pt reports brother Jayden Urbina, will be another transplant caregiver who drives.  Jayden Urbina, 34 yo brother, Chhaya CHAVEZ (Mary), does drive/own car, serves in [employer] and "[employer]".[phone]    Pt reports transplant transportation will be with Carlos Manuel Fletcher, boyfriend. Pt reports Carlos Manuel has his 's license re-instated and will be utilizing family car for patient's transplant. Patient and Carlos Manuel telephoned Carlos Manuel's father (Dharmesh FletcherJr.) during transplant psychosocial evaluation and Carlos Manuel's father confirmed that Carlos Manuel will be able to utilize family car for patient's transplant stay in [facility]. Pt reports he will be primary transplant caregiver and has transportation in place.  Carlos Manuel Fletcher, 30 yo boyfriend (7 years dating)/paid [place] Medicaid caregiver, Dasha CHAVEZ, works 22 hours per week as paid caregiver for patient. [phone]     Other transplant caregivers:  Angeline To, 48 yo foster mother (is actually patient's first cousin who adopted pt when pt's mother ), manager of [employer]. [phone]  Demi Art, 38 yo sister, [facility], sees patient every weekend, does drive/car, works as supervisor in physical therapy dept. [phone]  Dharmesh Fletcher Jr., boyfriend's dad, Mary CHAVEZ, does drive/own car. [phone]    Pt reports has call into [facility]  regarding any resources/money available for transplant costs not covered by medical insurance such as travel, meals and lodging. Pt reports will update transplant team regarding any resources [facility] can provide for transplant.    Pt reports will ask [facility] SW regarding psychiatric consult for transplant clearance. Pt reports will update transplant team regarding psychiatric clearance appointment arranged.    Transplant SW notified transplant " nurse coordinator of above.    Li Tavares MSW LCSW

## 2024-02-07 ENCOUNTER — SOCIAL WORK (OUTPATIENT)
Dept: TRANSPLANT | Facility: CLINIC | Age: 34
End: 2024-02-07
Payer: MEDICAID

## 2024-02-07 ENCOUNTER — TELEPHONE (OUTPATIENT)
Dept: TRANSPLANT | Facility: CLINIC | Age: 34
End: 2024-02-07
Payer: MEDICAID

## 2024-02-07 NOTE — PROGRESS NOTES
Follow up.    Returned patient's telephone call today [phone]. Pt reports met with [facility]  today and reports  will assist with psychiatric consult for organ transplant clearance.    Pt reports her [facility] , Carisa Vines, would like to learn more about transplant needs of travel, meals and lodging costs being covered. This transplant SW telephoned Carisa Vines at [phone]; no answer; left message on voice mail for return call next Wednesday once I am back in the office.    Li Tavares MSW [facility]

## 2024-02-07 NOTE — TELEPHONE ENCOUNTER
MA notes per Adherence form     FOR THE PAST THREE MONTHS:    9-AMA's  12/28/23 26 min. 12/30/23 51 min, 1/2/24 25 min, 1/4/24 76 min, 1/18/24 51 min, 1/20/24 34 min, 1/25/24 50 min, 1/30/24 38 min, 2/3/24 22 min all due to fluid restrictions and pain instructed to go to ER    1/16/24 21 min family emergency     2-No-shows 11/21/23, 11/23/23, both were planned     No concerns with care giving, transportation, or mental health    Scanned in pt's media    Rita Hurtado  Abdominal Transplant MA

## 2024-02-14 ENCOUNTER — DOCUMENTATION ONLY (OUTPATIENT)
Dept: TRANSPLANT | Facility: CLINIC | Age: 34
End: 2024-02-14
Payer: MEDICAID

## 2024-02-14 ENCOUNTER — TELEPHONE (OUTPATIENT)
Dept: TRANSPLANT | Facility: CLINIC | Age: 34
End: 2024-02-14
Payer: MEDICAID

## 2024-02-14 ENCOUNTER — SOCIAL WORK (OUTPATIENT)
Dept: TRANSPLANT | Facility: CLINIC | Age: 34
End: 2024-02-14
Payer: MEDICAID

## 2024-02-14 DIAGNOSIS — Z76.82 ORGAN TRANSPLANT CANDIDATE: Primary | ICD-10-CM

## 2024-02-14 NOTE — TELEPHONE ENCOUNTER
Phoned patient and informed of Eval orders being mailed to home to be completed . Verbalized understanding.

## 2024-02-14 NOTE — PROGRESS NOTES
Follow up with [facility]  [facility] [phone]. Carisa not available; left message for return call.    Li Tavares MSW LCSW

## 2024-02-21 ENCOUNTER — TELEPHONE (OUTPATIENT)
Dept: TRANSPLANT | Facility: CLINIC | Age: 34
End: 2024-02-21
Payer: MEDICAID

## 2024-02-21 NOTE — TELEPHONE ENCOUNTER
Spoke w/Galo at [facility] regarding pt's test. Pt was unable to have test done due to being a very hard stick. His supervisor tried with the Ultrasound machine and still could not get a vein to put an iv in. Galo stated she may need a PICC line or Med Port in place for this. If so, they will needs orders put in. Pt traveled 4 hours from [place] to have the test and was unsuccessful. Pt was currently waiting for her ride to travel back to get her. Pt stated there is an [facility] across from her dialysis unit. Test need to be r/s for a different day and closer to her home.

## 2024-02-21 NOTE — TELEPHONE ENCOUNTER
----- Message from Jose Hutchins MA sent at 2/21/2024  9:14 AM CST -----  Regarding: FW: discuss issue unable to complete test  Contact: Galo Jasmine     ----- Message -----  From: Asya Israel  Sent: 2/21/2024   8:27 AM CST  To: [facility] Pre-Kidney Transplant Non-Clinical  Subject: discuss issue unable to complete test            Galo from [facility] is requesting return call to discuss patient came in for her tests today but was unable to complete wants to discuss issues and r/s. Please call today at your convenience     Galo Jasmine

## 2024-02-27 DIAGNOSIS — Z76.82 ORGAN TRANSPLANT CANDIDATE: Primary | ICD-10-CM

## 2024-02-28 ENCOUNTER — TELEPHONE (OUTPATIENT)
Dept: ENDOSCOPY | Facility: HOSPITAL | Age: 34
End: 2024-02-28
Payer: MEDICAID

## 2024-02-28 NOTE — TELEPHONE ENCOUNTER
----- Message from Esperanza Willingham sent at 2/28/2024  8:36 AM CST -----    ----- Message -----  From: Marv Escamilla MA  Sent: 2/27/2024   2:39 PM CST  To: [facility] Schedulers    The attached pt needs an appt for pre kid txp wrk up. Pt will be here for an appt on 4/5/24. Please schedule on that day if possible, if not, in [place].       Thanks,          Marv

## 2024-03-13 ENCOUNTER — TELEPHONE (OUTPATIENT)
Dept: TRANSPLANT | Facility: CLINIC | Age: 34
End: 2024-03-13
Payer: MEDICAID

## 2024-03-13 NOTE — TELEPHONE ENCOUNTER
Spoke with  at [facility] to get pt santiago'd. Message sent to cardiology clinic to get santiago'd and will call pt with the appt date, time and location.

## 2024-03-13 NOTE — TELEPHONE ENCOUNTER
Attempted to contact pt regarding virtual appt that was santiago'd for her. Unsuccessful attempt. Left detailed message with date and time. Reminder mailed.

## 2024-04-03 ENCOUNTER — TELEPHONE (OUTPATIENT)
Dept: CARDIOLOGY | Facility: HOSPITAL | Age: 34
End: 2024-04-03
Payer: MEDICAID

## 2024-04-05 ENCOUNTER — HOSPITAL ENCOUNTER (OUTPATIENT)
Dept: RADIOLOGY | Facility: HOSPITAL | Age: 34
Discharge: HOME | End: 2024-04-05
Attending: NURSE PRACTITIONER
Payer: MEDICAID

## 2024-04-05 ENCOUNTER — OFFICE VISIT (OUTPATIENT)
Dept: INFECTIOUS DISEASES | Facility: CLINIC | Age: 34
End: 2024-04-05
Payer: MEDICAID

## 2024-04-05 ENCOUNTER — HOSPITAL ENCOUNTER (OUTPATIENT)
Dept: CARDIOLOGY | Facility: HOSPITAL | Age: 34
Discharge: HOME | End: 2024-04-05
Attending: NURSE PRACTITIONER
Payer: MEDICAID

## 2024-04-05 VITALS
WEIGHT: 185.19 LBS | DIASTOLIC BLOOD PRESSURE: 90 MMHG | TEMPERATURE: 98 F | BODY MASS INDEX: 32.81 KG/M2 | HEIGHT: 63 IN | HEART RATE: 101 BPM | SYSTOLIC BLOOD PRESSURE: 148 MMHG

## 2024-04-05 VITALS
SYSTOLIC BLOOD PRESSURE: 214 MMHG | DIASTOLIC BLOOD PRESSURE: 115 MMHG | BODY MASS INDEX: 33.13 KG/M2 | HEIGHT: 63 IN | WEIGHT: 187 LBS | HEART RATE: 98 BPM

## 2024-04-05 DIAGNOSIS — Z76.82 ORGAN TRANSPLANT CANDIDATE: ICD-10-CM

## 2024-04-05 LAB
CV PHARM DOSE: 0.4 MG
CV STRESS BASE HR: 98 BPM
DIASTOLIC BLOOD PRESSURE: 115 MMHG
EJECTION FRACTION- HIGH: 59 %
END DIASTOLIC INDEX-HIGH: 155 ML/M2
END DIASTOLIC INDEX-LOW: 91 ML/M2
END SYSTOLIC INDEX-HIGH: 78 ML/M2
END SYSTOLIC INDEX-LOW: 40 ML/M2
NUC REST DIASTOLIC VOLUME INDEX: 84
NUC REST EJECTION FRACTION: 50
NUC REST SYSTOLIC VOLUME INDEX: 42
NUC STRESS DIASTOLIC VOLUME INDEX: 126
NUC STRESS EJECTION FRACTION: 47 %
NUC STRESS SYSTOLIC VOLUME INDEX: 67
OHS CV CPX 1 MINUTE RECOVERY HEART RATE: 109 BPM
OHS CV CPX 85 PERCENT MAX PREDICTED HEART RATE MALE: 160
OHS CV CPX MAX PREDICTED HEART RATE: 188
OHS CV CPX PATIENT IS FEMALE: 1
OHS CV CPX PATIENT IS MALE: 0
OHS CV CPX PEAK DIASTOLIC BLOOD PRESSURE: 100 MMHG
OHS CV CPX PEAK HEAR RATE: 98 BPM
OHS CV CPX PEAK RATE PRESSURE PRODUCT: NORMAL
OHS CV CPX PEAK SYSTOLIC BLOOD PRESSURE: 172 MMHG
OHS CV CPX PERCENT MAX PREDICTED HEART RATE ACHIEVED: 55
OHS CV CPX RATE PRESSURE PRODUCT PRESENTING: NORMAL
RETIRED EF AND QEF - SEE NOTES: 47 %
SYSTOLIC BLOOD PRESSURE: 214 MMHG

## 2024-04-05 PROCEDURE — A9502 TC99M TETROFOSMIN: HCPCS | Mod: TXP | Performed by: NURSE PRACTITIONER

## 2024-04-05 PROCEDURE — 99999 PR PBB SHADOW E&M-EST. PATIENT-LVL V: CPT | Mod: PBBFAC,TXP,, | Performed by: INTERNAL MEDICINE

## 2024-04-05 PROCEDURE — 78452 HT MUSCLE IMAGE SPECT MULT: CPT | Mod: 26,TXP,, | Performed by: INTERNAL MEDICINE

## 2024-04-05 PROCEDURE — 93017 CV STRESS TEST TRACING ONLY: CPT | Mod: TXP

## 2024-04-05 PROCEDURE — 74176 CT ABD & PELVIS W/O CONTRAST: CPT | Mod: 26,TXP,, | Performed by: RADIOLOGY

## 2024-04-05 PROCEDURE — 93016 CV STRESS TEST SUPVJ ONLY: CPT | Mod: TXP,,, | Performed by: INTERNAL MEDICINE

## 2024-04-05 PROCEDURE — 99205 OFFICE O/P NEW HI 60 MIN: CPT | Mod: S$PBB,TXP,, | Performed by: INTERNAL MEDICINE

## 2024-04-05 PROCEDURE — 3080F DIAST BP >= 90 MM HG: CPT | Mod: CPTII,TXP,, | Performed by: INTERNAL MEDICINE

## 2024-04-05 PROCEDURE — 63600175 PHARM REV CODE 636 W HCPCS: Mod: TXP | Performed by: NURSE PRACTITIONER

## 2024-04-05 PROCEDURE — 3046F HEMOGLOBIN A1C LEVEL >9.0%: CPT | Mod: CPTII,TXP,, | Performed by: INTERNAL MEDICINE

## 2024-04-05 PROCEDURE — 3008F BODY MASS INDEX DOCD: CPT | Mod: CPTII,TXP,, | Performed by: INTERNAL MEDICINE

## 2024-04-05 PROCEDURE — 99215 OFFICE O/P EST HI 40 MIN: CPT | Mod: PBBFAC,25,TXP | Performed by: INTERNAL MEDICINE

## 2024-04-05 PROCEDURE — 3077F SYST BP >= 140 MM HG: CPT | Mod: CPTII,TXP,, | Performed by: INTERNAL MEDICINE

## 2024-04-05 PROCEDURE — 74176 CT ABD & PELVIS W/O CONTRAST: CPT | Mod: TC,TXP

## 2024-04-05 PROCEDURE — 1159F MED LIST DOCD IN RCRD: CPT | Mod: CPTII,TXP,, | Performed by: INTERNAL MEDICINE

## 2024-04-05 PROCEDURE — 93018 CV STRESS TEST I&R ONLY: CPT | Mod: TXP,,, | Performed by: INTERNAL MEDICINE

## 2024-04-05 RX ORDER — REGADENOSON 0.08 MG/ML
0.4 INJECTION, SOLUTION INTRAVENOUS
Status: COMPLETED | OUTPATIENT
Start: 2024-04-05 | End: 2024-04-05

## 2024-04-05 RX ORDER — AMINOPHYLLINE 25 MG/ML
75 INJECTION, SOLUTION INTRAVENOUS ONCE
Status: COMPLETED | OUTPATIENT
Start: 2024-04-05 | End: 2024-04-05

## 2024-04-05 RX ORDER — CARVEDILOL 12.5 MG/1
TABLET ORAL
COMMUNITY
Start: 2024-03-13

## 2024-04-05 RX ADMIN — TETROFOSMIN 30.4 MILLICURIE: 1.38 INJECTION, POWDER, LYOPHILIZED, FOR SOLUTION INTRAVENOUS at 08:04

## 2024-04-05 RX ADMIN — TETROFOSMIN 10.2 MILLICURIE: 1.38 INJECTION, POWDER, LYOPHILIZED, FOR SOLUTION INTRAVENOUS at 07:04

## 2024-04-05 RX ADMIN — REGADENOSON 0.4 MG: 0.08 INJECTION, SOLUTION INTRAVENOUS at 08:04

## 2024-04-05 RX ADMIN — AMINOPHYLLINE 75 MG: 25 INJECTION, SOLUTION INTRAVENOUS at 08:04

## 2024-04-05 NOTE — Clinical Note
Please call to consent for [facility] - I think she is already listed. She is expecting your call - her  is kidney transplant recipient as well just jose.

## 2024-04-05 NOTE — PROGRESS NOTES
PRE-TRANSPLANT INFECTIOUS DISEASE CONSULT    Reason for Visit:  Pre-transplant evaluation  Referring Provider: Millicent Prather     History of Present Illness:    33 y.o. female with a history of well controlled HIV, HTN, DM2 c/b ESRD on HD presents for pre-kidney transplant evaluation.    Infectious History:  Recent hospital admissions: Yes, 2023  Recent infections: No  Recent or current antibiotic use: No  History of recurrent infections *(sinus / pneumonia / UTI / SBP)*: No  History of diabetic foot wound or bone/joint infection: Yes  Recent dental infections, issues or procedures: No  History of chicken pox: Yes  History of shingles: No  History of STI: No  History of COVID infection: Yes    History of Immunosuppression:  Prior chemotherapy / immunosuppression: No  Prior transplant: No  History of splenectomy: No    Tuberculosis:  Prior screening for latent TB: Yes  Prior diagnosis of latent TB: No  Risk factors for TB *known exposure, incarceration, homelessness*: No    Geographical exposures:  Currently lives in [place] with   Lived in the following states: [place]  Lived or travelled to the [facility] [place]: No  International travel: No  Travel-associated illness: No    Social/Environmental:  Occupation:  was manager at [employer]   Pets: No   Livestock: No  Fishing / hunting: No  Hobbies: sing, movies  Water: [place]  Consumption of raw/undercooked meat or seafood?  No  Tobacco: No  Alcohol: No  Recreational drug use:  No  Sexual partners:       Past Histories:   Past Medical History:   Diagnosis Date    Anemia     ASCUS with positive high risk HPV cervical 12/18/2019    CLABSI (central line-associated bloodstream infection)     Constipation     Depression     Diabetes mellitus type I     Disorder of kidney and ureter     HIV infection     Hyperphosphatemia     Hypertension     Obesity     PVD (peripheral vascular disease)     Retinal detachment      Past Surgical History:   Procedure Laterality Date     ANGIOGRAPHY OF ARTERIOVENOUS SHUNT Left 10/15/2021    Procedure: Fistulogram with Possible Intervention;  Surgeon: Alena Barraza MD;  Location: [facility] INTERVENTIONAL NEPHROLOGY;  Service: Interventional Nephrology;  Laterality: Left;    ANGIOGRAPHY OF ARTERIOVENOUS SHUNT Left 3/9/2022    Procedure: Fistulogram with Possible Intervention;  Surgeon: Wallace Emery MD;  Location: [facility] INTERVENTIONAL NEPHROLOGY;  Service: Interventional Nephrology;  Laterality: Left;    ANGIOGRAPHY OF ARTERIOVENOUS SHUNT Left 2/3/2023    Procedure: Fistulogram with Possible Intervention;  Surgeon: Link Clifford MD;  Location: [facility] INTERVENTIONAL NEPHROLOGY;  Service: Interventional Nephrology;  Laterality: Left;    ANGIOGRAPHY OF ARTERIOVENOUS SHUNT Left 2/8/2023    Procedure: ANGIOGRAM, AV FISTULA OR GRAFT, WITH PERCUTANEOUS CORONARY INTERVENTION IF INDICATED;  Surgeon: Link Clifford MD;  Location: [facility] INTERVENTIONAL NEPHROLOGY;  Service: Interventional Nephrology;  Laterality: Left;    AV FISTULA PLACEMENT      BELOW KNEE AMPUTATION OF LOWER EXTREMITY Right 5/2/2023    Procedure: Formalization of Right BKA;  Surgeon: Ruiz Martinez MD;  Location: [facility] MAIN OR;  Service: General;  Laterality: Right;    Perm a cath      UT PLACE NEEDLE IN VEIN  4/30/2023    WOUND DEBRIDEMENT Right 4/26/2023    Procedure: DEBRIDEMENT, WOUND, RIGHT LOWER EXTREMITY;  Surgeon: Ruiz Martinez MD;  Location: [facility] MAIN OR;  Service: General;  Laterality: Right;     Family History   Problem Relation Age of Onset    Diabetes Mother     Hypertension Mother     Kidney disease Sister     Diabetes Sister     Hypertension Sister     Retinal detachment Sister     Diabetes Brother     Hypertension Brother     Diabetes Maternal Aunt     Hypertension Maternal Aunt     Glaucoma Maternal Aunt     Diabetes Maternal Uncle     Kidney disease Maternal Uncle     Hypertension Maternal Uncle     Glaucoma Maternal Uncle     Amblyopia Neg Hx     Blindness Neg  Hx     Cataracts Neg Hx     Macular degeneration Neg Hx     Strabismus Neg Hx     Breast cancer Neg Hx     Colon cancer Neg Hx     Cervical cancer Neg Hx     Uterine cancer Neg Hx     Pancreatic cancer Neg Hx      Social History     Tobacco Use    Smoking status: Never    Smokeless tobacco: Never   Substance Use Topics    Alcohol use: Never    Drug use: Never     Review of patient's allergies indicates:   Allergen Reactions    Ferrous gluconate Hives     Hives, rashes and itching and lesion all over her body    Cherry Hives    Sulfamethoxazole-trimethoprim Rash    Tramadol Hives, Itching and Rash     Patient states that it causes CHF         Immunization History:  Received all childhood vaccines: Yes  All household members receive annual flu vaccine: Yes  All household members are up to date on COVID vaccine: Yes      Current antibiotics:  Antibiotics (From admission, onward)      None              Review of Systems  Review of Systems   Constitutional: Negative for chills and fever.   HENT:  Negative for sore throat.    Eyes:  Negative for photophobia.   Cardiovascular:  Negative for chest pain, dyspnea on exertion, orthopnea and syncope.   Respiratory:  Negative for cough, shortness of breath and sputum production.    Endocrine: Negative for cold intolerance and heat intolerance.   Hematologic/Lymphatic: Negative for adenopathy.   Skin:  Negative for poor wound healing.   Musculoskeletal:  Negative for arthritis and back pain.   Gastrointestinal:  Negative for abdominal pain and diarrhea.   Genitourinary:  Negative for dysuria.   Neurological:  Negative for dizziness, headaches, numbness and weakness.   Psychiatric/Behavioral:  Negative for altered mental status, depression and memory loss.           Objective  Physical Exam  Constitutional:       Appearance: She is well-developed.   HENT:      Head: Normocephalic and atraumatic.   Eyes:      Pupils: Pupils are equal, round, and reactive to light.   Cardiovascular:       Rate and Rhythm: Normal rate.   Pulmonary:      Effort: Pulmonary effort is normal.      Breath sounds: Normal breath sounds.   Abdominal:      General: Bowel sounds are normal.      Palpations: Abdomen is soft.   Musculoskeletal:         General: No tenderness.      Cervical back: Normal range of motion and neck supple.      Comments: RLE prosthesis    Skin:     General: Skin is warm and dry.   Neurological:      Mental Status: She is alert and oriented to person, place, and time.           Labs:    CBC:   Lab Results   Component Value Date    WBC 10.83 01/31/2024    HGB 10.2 (L) 01/31/2024    HCT 31.7 (L) 01/31/2024    MCV 74 (L) 01/31/2024     01/31/2024    GRAN 6.6 01/31/2024    GRAN 61.1 01/31/2024    LYMPH 3.1 01/31/2024    LYMPH 28.7 01/31/2024    MONO 0.8 01/31/2024    MONO 7.4 01/31/2024    EOSINOPHIL 1.9 01/31/2024       Syphilis screening:   Lab Results   Component Value Date    RPR Non-reactive 01/31/2024        TB screening:   Lab Results   Component Value Date    TBGOLDPLUS Negative 01/31/2024       HIV screening:   Lab Results   Component Value Date    NAM01BDBJ Reactive (A) 01/31/2024       Strongyloides IgG:   Lab Results   Component Value Date    STRONGANTIGG Negative 01/31/2024       Hepatitis Serologies:   Lab Results   Component Value Date    HEPAIGG Non-reactive 01/31/2024    HEPBCAB Non-reactive 01/31/2024    HEPBSAB 14.60 01/31/2024    HEPBSAB Reactive 01/31/2024    HEPCAB Non-reactive 01/31/2024        Varicella IgG:   Lab Results   Component Value Date    VARICELLAINT Positive 01/31/2024         Immunization History   Administered Date(s) Administered    DTP 01/29/1992, 04/29/1992, 07/08/1992, 01/22/1996    HPV 9-Valent 12/16/2021    Hepatitis A, Adult 11/22/2017, 06/19/2019    Hepatitis B, Pediatric/Adolescent 08/15/2003, 08/15/2005, 11/09/2005    HiB PRP-OMP 01/29/1992, 04/29/1992, 07/08/1992, 01/22/1996    Influenza - Quadrivalent - PF *Preferred* (6 months and older)  11/22/2017, 10/04/2023    MMR 01/22/1996, 05/22/1996    OPV 01/29/1992, 04/29/1992, 01/22/1996    Pneumococcal Conjugate - 13 Valent 02/14/2018    Pneumococcal Polysaccharide - 23 Valent 07/29/2020    Tdap 02/14/2018          Assessment and Plan    34 y/o F h/o HTN, DM2 c/b ESRD on HD, Well controlled HIV on biktarvy VL UD, CD4 768, follows very closely with local ID physician Dr. Pablo, hospitalized 1 year ago for sepsis, nec fasc of R foot s/p BKA, MSSA bacteremia, and has been infection free since that time here for kidney transplant evaluation    1. Risks of Infection: Available serologies were reviewed. No unusual risks of infection or significant barriers to transplantation were identified from the infectious disease standpoint given the information available at this time.    - Acute infectious issues: None   - Pending serologies: none   - Please call if any pending serologic testing is positive.    2. Immunizations:  Based on the patient's immunization history and serologies, the following immunizations are recommended:  - Hepatitis A    Patient does not have immunity to hepatitis A    Vaccination ordered today: No. Reason for not ordering: will get from ID doc in [place]   - Hepatitis B    Patient does have immunity to hepatitis B    Vaccination ordered today: No. Reason for not ordering: Immunity   - COVID    Current CDC vaccination recommendations were discussed with the patient   - Annual high dose influenza     Vaccination ordered today: No. Reason for not ordering: vaccination up to date   - Prevnar 20    Vaccination ordered today: No. Reason for not ordering: will get locally   - Tdap    Vaccination ordered today: No. Reason for not ordering: vaccination up to date   - Shingrix    Vaccination ordered today: No. Reason for not ordering: will get locally    Recommended Pre-Transplant Immunization Schedule   Vaccine  0m 1m 2m 6m   Pneumococcal conjugate vaccine (Prevnar 20) X       Tetanus-diphtheria-pertussis (Tdap)* X      Hepatitis A Vaccine (Havrix)** X   X   Hepatitis B Vaccine (Heplisav)** X X     Influenza (annual) X      Zoster Recombinant Vaccine (Shingrix) X  X           *Administer booster every 10 years.       **Administer if no immunity demonstrated on serologies               Patient will receive vaccines primary    3. Counseling:   I discussed with the patient the risk for increased susceptibility to infections following transplantation including increased risk for infection right after transplant and if rejection should occur.  The patient has been counseled on the importance of vaccinations to decrease risk of infection and severe illness. Specific guidance has been provided to the patient regarding the patient's occupation, hobbies and activities to avoid future infectious complications.     4. Transplant Candidacy: Based on available information, there are no identified significant barriers to transplantation from an infectious disease standpoint.  Final determination of transplant candidacy will be made once evaluation is complete and reviewed by the Selection Committee.      Follow up with infectious disease as needed.       The total time for evaluation and management services performed on 04/05/2024 was greater than 55 minutes.     Discussed with Dr. Pablo will get Hep A, shingrix, [id number] locally    Discussed risks benefits of HOPE in action study with her and her  and they are interested, will arrange consent discussion

## 2024-04-08 ENCOUNTER — OFFICE VISIT (OUTPATIENT)
Dept: CARDIOLOGY | Facility: CLINIC | Age: 34
End: 2024-04-08
Payer: MEDICAID

## 2024-04-08 DIAGNOSIS — E10.22 TYPE 1 DM WITH HYPERTENSION AND ESRD ON DIALYSIS: ICD-10-CM

## 2024-04-08 DIAGNOSIS — Z01.810 PREOPERATIVE CARDIOVASCULAR EXAMINATION: Primary | ICD-10-CM

## 2024-04-08 DIAGNOSIS — I12.0 TYPE 1 DM WITH HYPERTENSION AND ESRD ON DIALYSIS: ICD-10-CM

## 2024-04-08 DIAGNOSIS — Z76.82 KIDNEY TRANSPLANT CANDIDATE: ICD-10-CM

## 2024-04-08 DIAGNOSIS — Z99.2 TYPE 1 DM WITH HYPERTENSION AND ESRD ON DIALYSIS: ICD-10-CM

## 2024-04-08 DIAGNOSIS — I10 ESSENTIAL HYPERTENSION: ICD-10-CM

## 2024-04-08 DIAGNOSIS — N18.6 TYPE 1 DM WITH HYPERTENSION AND ESRD ON DIALYSIS: ICD-10-CM

## 2024-04-08 DIAGNOSIS — B20 AIDS: ICD-10-CM

## 2024-04-08 PROCEDURE — 3046F HEMOGLOBIN A1C LEVEL >9.0%: CPT | Mod: CPTII,95,TXP, | Performed by: INTERNAL MEDICINE

## 2024-04-08 PROCEDURE — 99204 OFFICE O/P NEW MOD 45 MIN: CPT | Mod: 95,TXP,, | Performed by: INTERNAL MEDICINE

## 2024-04-08 PROCEDURE — 1160F RVW MEDS BY RX/DR IN RCRD: CPT | Mod: CPTII,95,TXP, | Performed by: INTERNAL MEDICINE

## 2024-04-08 PROCEDURE — 1159F MED LIST DOCD IN RCRD: CPT | Mod: CPTII,95,TXP, | Performed by: INTERNAL MEDICINE

## 2024-04-08 NOTE — PROGRESS NOTES
Subjective:   Patient ID:  Gi To is a 33 y.o. female who presents for follow up of No chief complaint on file.    The patient location is: [place], [place]  The chief complaint leading to consultation is: pre-transplant     Visit type: audiovisual    Face to Face time with patient: 8 minutes   15 minutes of total time spent on the encounter, which includes face to face time and non-face to face time preparing to see the patient (eg, review of tests), Obtaining and/or reviewing separately obtained history, Documenting clinical information in the electronic or other health record, Independently interpreting results (not separately reported) and communicating results to the patient/family/caregiver, or Care coordination (not separately reported).         Each patient to whom he or she provides medical services by telemedicine is:  (1) informed of the relationship between the physician and patient and the respective role of any other health care provider with respect to management of the patient; and (2) notified that he or she may decline to receive medical services by telemedicine and may withdraw from such care at any time.    Notes:       HPI: Very pleasant woman with ESRD on HD, Type I DM, and AIDS here for pre-renal-transplant evaluation.  She reports no angina, new FLANAGAN, palpitations, or syncope.  No PND/orthopnea.    Recent vasodilator stress nuclear imaging test was negative for myocardial ischemia.        Patient Active Problem List   Diagnosis    Right eye affected by proliferative diabetic retinopathy with traction retinal detachment involving macula, associated with type 1 diabetes mellitus    Essential hypertension    AIDS    Dry eye syndrome of both eyes    Arteriovenous fistula    CAP (community acquired pneumonia)    Depression    Vision loss of left eye    Status post colposcopy    Hypervolemia    Hyperphosphatemia    Hypocalcemia    Anemia due to chronic kidney disease, on chronic  dialysis    SOB (shortness of breath)    Right ankle pain    Diabetic polyneuropathy associated with type 1 diabetes mellitus    LGSIL on Pap smear of cervix    JULIA I (cervical intraepithelial neoplasia I)    COVID    Acute hypoxemic respiratory failure    Hyperparathyroidism due to ESRD on dialysis    Hypertensive urgency    Acute hyperkalemia    Class 1 obesity due to excess calories with serious comorbidity and body mass index (BMI) of 32.0 to 32.9 in adult    QT prolongation    Back pain    Mastoiditis of left side    Flank pain    Shortness of breath    Problem with dialysis access    Problem with vascular access    Necrotizing fasciitis    Staphylococcus aureus infection    MSSA bacteremia    Type 1 diabetes mellitus    Cardiac arrest    Acute encephalopathy    Altered mental status    Opiate overdose    Polypharmacy    Diabetes mellitus with chronic kidney disease on chronic dialysis, with long-term current use of insulin    PAD (peripheral artery disease)    Currently asymptomatic HIV infection, with history of HIV-related illness    Type 1 DM with hypertension and ESRD on dialysis       Current Outpatient Medications   Medication Sig    acetaminophen (TYLENOL) 650 MG TbSR Take 650 mg by mouth every 8 (eight) hours as needed (pain).    qjimysfmv-vsvgugfz-ohrpwdk ala (BIKTARVY) -25 mg (25 kg or greater) Take 1 tablet by mouth once daily.    blood sugar diagnostic Strp To check BG 3 times daily, to use with insurance preferred meter    blood-glucose meter kit To check BG 3 times daily, to use with insurance preferred meter    carvediloL (COREG) 12.5 MG tablet     cloNIDine (CATAPRES) 0.1 MG tablet Take 1 tablet (0.1 mg total) by mouth 2 (two) times daily.    epoetin juvenal-epbx (RETACRIT) 3,000 unit/mL injection Inject 2 mLs (6,000 Units total) into the skin every Tues, Thurs, Sat.    HYDROcodone-acetaminophen (NORCO)  mg per tablet Take 1 tablet by mouth every 4 (four) hours as needed.    insulin  "glargine (LANTUS) 100 unit/mL injection Inject 15 Units into the skin 2 (two) times a day.    lactulose (CHRONULAC) 10 gram/15 mL solution Take 10 g by mouth daily as needed (constipation). (Patient not taking: Reported on 2024)    lancets [facility] [place] To check BG 3 times daily, to use with insurance preferred meter    LIDOcaine (LIDODERM) 5 % Place 1 patch onto the skin once daily. Remove & Discard patch within 12 hours or as directed by MD    melatonin (MELATIN) 3 mg tablet Take 2 tablets (6 mg total) by mouth nightly as needed for Insomnia.    NIFEdipine (PROCARDIA-XL) 30 MG (OSM) 24 hr tablet Take 1 tablet (30 mg total) by mouth 2 (two) times a day.    ondansetron (ZOFRAN-ODT) 4 MG TbDL Take 1 tablet (4 mg total) by mouth daily as needed (nausea).    pen needle, diabetic (BD ULTRA-FINE SHORT PEN NEEDLE) 31 gauge x 5/16" Ndle 1 Needle by Misc.(Non-Drug; Combo Route) route once daily.    polyethylene glycol (GLYCOLAX) 17 gram/dose powder SMARTSI Gram(s) By Mouth Daily PRN (Patient not taking: Reported on 2024)    pregabalin (LYRICA) 50 MG capsule Take 1 capsule (50 mg total) by mouth every Tues, Thurs, Sat. After dialysis    sevelamer carbonate (RENVELA) 800 mg Tab Take 3 tablets (2,400 mg total) by mouth 3 (three) times daily with meals.     No current facility-administered medications for this visit.       ROS  The review of systems is negative except as above.     Objective:   Physical Exam    Lab Results   Component Value Date    WBC 10.83 2024    HGB 10.2 (L) 2024    HCT 31.7 (L) 2024    MCV 74 (L) 2024     2024         Chemistry        Component Value Date/Time     (L) 2024 0705     (L) 2019 0520    K 4.1 2024 0705    K 4.8 2019 0520    CL 91 (L) 2024 0705    CO2 25 2024 0705    CO2 27 2019 0520    BUN 42 (H) 2024 0705    CREATININE 10.0 (H) 2024    CREATININE 8.40 (H) 2019 0520     " (H) 01/31/2024 0705        Component Value Date/Time    CALCIUM 8.6 (L) 01/31/2024 0705    ALKPHOS 147 (H) 01/31/2024 0705    AST 10 01/31/2024 0705    ALT 13 01/31/2024 0705    BILITOT 0.4 01/31/2024 0705    ESTGFRAFRICA 4.3 (A) 05/13/2022 2143    EGFRNONAA 3.8 (A) 05/13/2022 2143            Lab Results   Component Value Date    CHOL 130 01/31/2024    CHOL 111 (L) 06/09/2023     Lab Results   Component Value Date    HDL 37 (L) 01/31/2024    HDL 29 (L) 06/09/2023     Lab Results   Component Value Date    LDLCALC 74.0 01/31/2024    LDLCALC 64.0 06/09/2023     Lab Results   Component Value Date    TRIG 95 01/31/2024    TRIG 86 06/09/2023     Lab Results   Component Value Date    CHOLHDL 28.5 01/31/2024    CHOLHDL 26.1 06/09/2023       Lab Results   Component Value Date    TSH 0.342 (L) 05/10/2023       Lab Results   Component Value Date    HGBA1C >14.0 (H) 01/31/2024       Assessment:     1. Preoperative cardiovascular examination    2. Kidney transplant candidate    3. Essential hypertension    4. Type 1 DM with hypertension and ESRD on dialysis    5. AIDS        Plan:     She has no angina, heart failure, or unstable arrhythmia.  No further cardiovascular testing is required prior to proceeding to the operating room.      Continue current medicines.    Diet/exercise goals reinforced.    F/U PRN

## 2024-04-22 ENCOUNTER — TELEPHONE (OUTPATIENT)
Dept: TRANSPLANT | Facility: CLINIC | Age: 34
End: 2024-04-22
Payer: MEDICAID

## 2024-04-22 NOTE — TELEPHONE ENCOUNTER
----- Message from Janneth Trammell MD sent at 4/19/2024  7:18 PM CDT -----  Agree with Dr. Hodges. Probably technically ok right now. Need to emphasize the importance of living donor and weight loss.    EB  ----- Message -----  From: Kt Hodges Jr., MD  Sent: 4/18/2024   4:00 PM CDT  To: Shaun Mendez MD; Janneth Trammell MD; #    Fairly significant Iliac calcifications are present; doppler is triphasic; PTH pretty high 675; could use weight loss.   Likely feasible currently. Somewhat high risk.    Needs CT every 2 years.   sending to Dr. Trammell and Dr. Mendez

## 2024-06-22 PROBLEM — E87.5 HYPERKALEMIA: Status: ACTIVE | Noted: 2024-06-22

## 2024-06-22 PROBLEM — R11.2 INTRACTABLE NAUSEA AND VOMITING: Status: ACTIVE | Noted: 2024-06-22

## 2024-06-22 PROBLEM — F41.9 ANXIETY: Status: ACTIVE | Noted: 2024-06-22

## 2024-06-22 PROBLEM — E66.811 CLASS 1 OBESITY WITH BODY MASS INDEX (BMI) OF 32.0 TO 32.9 IN ADULT: Status: ACTIVE | Noted: 2024-06-22

## 2024-06-22 PROBLEM — E66.9 CLASS 1 OBESITY WITH BODY MASS INDEX (BMI) OF 32.0 TO 32.9 IN ADULT: Status: ACTIVE | Noted: 2024-06-22

## 2024-06-24 ENCOUNTER — TELEPHONE (OUTPATIENT)
Dept: SURGERY | Facility: CLINIC | Age: 34
End: 2024-06-24
Payer: MEDICAID

## 2024-06-24 NOTE — TELEPHONE ENCOUNTER
Called patient to confirm appt for endo in  tomorrow 6/25. She needed to reschedule for a Monday Wednesday or Friday due to dialysis. I informed her that it would have to be in [place] and she said that's fine because she is coming from [place] anyway. I instructed her to call the [place] office in order to reschedule and get on their schedule. I left the appt though so they could see it.

## 2024-07-16 ENCOUNTER — SOCIAL WORK (OUTPATIENT)
Dept: ADMINISTRATIVE | Facility: OTHER | Age: 34
End: 2024-07-16
Payer: MEDICAID

## 2024-07-16 NOTE — PROGRESS NOTES
Sw received a referral to assist with therapy resources. [facility] to see Patient. However, the Clinic does not have a therapist available to see her. Sw mailed Patient a letter explaining this. She was provided the contact information for some in-network providers. Sw encouraged her to contact one to schedule an assessment if she is still in need of services.    Nicole Brock, JAGDEEP    [phone]

## 2024-07-24 ENCOUNTER — TELEPHONE (OUTPATIENT)
Dept: TRANSPLANT | Facility: CLINIC | Age: 34
End: 2024-07-24
Payer: MEDICAID

## 2024-07-24 NOTE — TELEPHONE ENCOUNTER
Spoke to Carisa Galaviz- at [facility]. Stated she will have the social workers get pt santiago'd for an evaluation. Pt has GYN visit santiago'd 8/29 and leisa'd Endocrrin to 12/13. Carisa will try to get that appt sooner. She will fax over records

## 2024-11-18 DIAGNOSIS — Z76.82 ORGAN TRANSPLANT CANDIDATE: ICD-10-CM

## 2024-11-18 DIAGNOSIS — N18.6 END STAGE RENAL DISEASE: Primary | ICD-10-CM

## 2024-11-19 PROBLEM — N18.6 ESRD (END STAGE RENAL DISEASE): Status: ACTIVE | Noted: 2024-11-19

## 2024-11-19 PROBLEM — K76.9 HEPATOPATHY: Status: ACTIVE | Noted: 2024-11-19

## 2024-11-19 PROBLEM — Z21 ASYMPTOMATIC HIV INFECTION, WITH NO HISTORY OF HIV-RELATED ILLNESS: Status: ACTIVE | Noted: 2024-11-19

## 2024-11-20 PROBLEM — E87.79 VOLUME OVERLOAD STATE OF HEART: Status: ACTIVE | Noted: 2024-11-20

## 2024-11-20 PROBLEM — R74.01 TRANSAMINITIS: Status: ACTIVE | Noted: 2024-11-20

## 2024-11-21 PROBLEM — E87.5 ACUTE HYPERKALEMIA: Status: RESOLVED | Noted: 2022-01-06 | Resolved: 2024-11-21

## 2024-12-12 ENCOUNTER — TELEPHONE (OUTPATIENT)
Dept: ENDOCRINOLOGY | Facility: CLINIC | Age: 34
End: 2024-12-12
Payer: MEDICAID

## 2024-12-12 ENCOUNTER — TELEPHONE (OUTPATIENT)
Dept: TRANSPLANT | Facility: CLINIC | Age: 34
End: 2024-12-12
Payer: MEDICAID

## 2024-12-12 NOTE — TELEPHONE ENCOUNTER
----- Message from Montez Lagunas sent at 2024 10:26 AM CST -----  Contact: Patient    ----- Message -----  From: Gris Crane  Sent: 2024   8:31 AM CST  To: [facility] Staff    Gi To  MRN: [id number]  : 1990  PCP: No, Primary Doctor  Home Phone      [phone]  Work Phone      Not on file.  Mobile          [phone]      MESSAGE: Patient would like a returned call to reschedule her appointment tomorrow morning.     PHONE; [phone]

## 2024-12-12 NOTE — TELEPHONE ENCOUNTER
Spoke with patient regarding her appointment, she would like to reschedule due her living in [place] and wanting to see if they had any provider closer to her that she can maybe change. Rescheduled for know by maybe later cancel in future due to provider change.

## 2024-12-13 ENCOUNTER — TELEPHONE (OUTPATIENT)
Dept: PSYCHIATRY | Facility: CLINIC | Age: 34
End: 2024-12-13
Payer: MEDICAID

## 2024-12-18 ENCOUNTER — OFFICE VISIT (OUTPATIENT)
Dept: PSYCHIATRY | Facility: CLINIC | Age: 34
End: 2024-12-18
Payer: MEDICAID

## 2024-12-18 DIAGNOSIS — Z99.2 ESRD (END STAGE RENAL DISEASE) ON DIALYSIS: Primary | ICD-10-CM

## 2024-12-18 DIAGNOSIS — N18.6 ESRD (END STAGE RENAL DISEASE) ON DIALYSIS: Primary | ICD-10-CM

## 2024-12-18 PROCEDURE — 3066F NEPHROPATHY DOC TX: CPT | Mod: CPTII,95,TXP, | Performed by: PSYCHIATRY & NEUROLOGY

## 2024-12-18 PROCEDURE — 3046F HEMOGLOBIN A1C LEVEL >9.0%: CPT | Mod: CPTII,95,TXP, | Performed by: PSYCHIATRY & NEUROLOGY

## 2024-12-18 PROCEDURE — 90792 PSYCH DIAG EVAL W/MED SRVCS: CPT | Mod: 95,TXP,, | Performed by: PSYCHIATRY & NEUROLOGY
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
## 2024-12-20 ENCOUNTER — TELEPHONE (OUTPATIENT)
Dept: SURGERY | Facility: CLINIC | Age: 34
End: 2024-12-20
Payer: MEDICAID

## 2024-12-20 ENCOUNTER — TELEPHONE (OUTPATIENT)
Dept: TRANSPLANT | Facility: CLINIC | Age: 34
End: 2024-12-20
Payer: MEDICAID

## 2024-12-20 NOTE — TELEPHONE ENCOUNTER
----- Message from Montez Garcia sent at 12/19/2024  2:15 PM CST -----  Regarding: FW: Scheduling Request  Contact: [phone]    ----- Message -----  From: Alexey Ramirez  Sent: 12/19/2024  12:21 PM CST  To: [facility] Pre-Kidney Transplant Non-Clinical  Subject: Scheduling Request                               Scheduling Request           Appt Type:  initial evaluation, work-up, and/or yearly follow-up for transplant     Date/Time Preference: As soon as available     Treating Provider: N/A     Caller Name: Gi To     Contact Preference:  [phone]     Comments/notes: Pt is calling to schedule with Marv Escamilla

## 2024-12-20 NOTE — TELEPHONE ENCOUNTER
----- Message from Pam sent at 12/19/2024  4:49 PM CST -----  Type: General Call Back     Name of Caller:pt   Reason  pt states that she was sent a letter stating that she needs to have clearance for her transplant surgery, and the pt states the letter says the appt needs to be as soon as possible   Would the patient rather a call back or a response via [employer]? Call   Best Call Back Number:[phone]   Additional Information:

## 2024-12-20 NOTE — TELEPHONE ENCOUNTER
Spoke to pt and confirmed date of Endocrinology appt. Pt advised if date changes to let us know. Pt voiced understanding.

## 2025-01-08 ENCOUNTER — TELEPHONE (OUTPATIENT)
Dept: TRANSPLANT | Facility: CLINIC | Age: 35
End: 2025-01-08
Payer: MEDICAID

## 2025-01-08 ENCOUNTER — SOCIAL WORK (OUTPATIENT)
Dept: TRANSPLANT | Facility: CLINIC | Age: 35
End: 2025-01-08
Payer: MEDICAID

## 2025-01-08 NOTE — PROGRESS NOTES
completed follow-up for Kidney Selection Committee meeting on 1/10/2025.     SW confirmed with patient her caregiver will be Carlos Manuel Fletcher. Patient reported caregiver drives and has reliable transportation.  Patient has a back-up caregiver named Robert (unsure of last name during call). Patient asked to obtain Robert's last name and phone number. Patient reported follow-up with psychiatrist. Patient reported zoom visit with psychiatrist last week. Patient denies having any concerns and/or overwhelming feelings regarding transplant currently.     spoke with patient regarding lodging requirements. Patient reported hospital was going to find her somewhere to stay. SW informed patient that information is incorrect. Patient informed [place] apartments will no longer be available at a low fee to patients in a few months. It will be patients responsibility to find, arrange and pay for lodging after transplant. Patient informed she has to stay within 1.5 hours of this hospital after transplant for 4-5 weeks with a caregiver at all times.  provided patient with different lodging options to review. Patient informed again she will be responsible for any fees associated with lodging after transplant. Patient verbalized understanding.     Odette Garcia LCSW

## 2025-01-09 ENCOUNTER — TELEPHONE (OUTPATIENT)
Dept: TRANSPLANT | Facility: CLINIC | Age: 35
End: 2025-01-09
Payer: MEDICAID

## 2025-01-09 NOTE — TELEPHONE ENCOUNTER
received In-Basket:     Consult/Advisory      Name Of Caller:Candida [email or web address]         Contact Preference:[phone]      Nature of call:Candida is calling to speak to Odette about patients case and problems that is going on with it. Requesting a callback        attempted several times throughout the day to reach Candida Pena @ [facility], [phone] regarding patient to no avail.  left voice messages to return call.     Odette Garcia LCSW

## 2025-01-09 NOTE — TELEPHONE ENCOUNTER
MA notes for compliance on the pt for selection on 1/10/24:    MA unable to reach  at dialysis unit due to being off.  MA emailed and LM for the .  Unable to speak with the nurse concerning  compliance.     Rita Hurtado  Transplant Abdominal MA

## 2025-01-10 ENCOUNTER — EPISODE CHANGES (OUTPATIENT)
Dept: TRANSPLANT | Facility: HOSPITAL | Age: 35
End: 2025-01-10

## 2025-01-13 ENCOUNTER — TELEPHONE (OUTPATIENT)
Dept: TRANSPLANT | Facility: CLINIC | Age: 35
End: 2025-01-13
Payer: MEDICAID

## 2025-01-13 ENCOUNTER — COMMITTEE REVIEW (OUTPATIENT)
Dept: TRANSPLANT | Facility: CLINIC | Age: 35
End: 2025-01-13
Payer: MEDICAID

## 2025-01-13 NOTE — TELEPHONE ENCOUNTER
MA notes per Adherence form/per Sydnee Eddy ( dialysis unit ) with Renny  [phone]    FOR THE PAST THREE MONTHS:    11-AMA's 10/8/24 52 min, 10/12/24 28 min, 10/24/24 56 min, 11/27/24 59 min, 12/17/24 25 min, 12/21/24 44 min pt request     10/22/24 59 min, 11/14/24 59 min, 12/3/24 76 min, 12/10/24 56 min, doc appts.     10/26/24 63 min cramping and anxiety     9-No-shows 11/7/24, 11/26/24 12/24/24 12/31/24 planned to miss but did not make up treatments  11/16/24, 11/30/24  contacted unwilling to dialyze   11/18/24, 12/2/24 not contacted pt refused   11/23/24 called pt and left message     No concerns with care giving, transportation, or mental health    MA spoke with Sydnee (sin) she states pt is always just ready to stop treatments doesn't give a reasoning. Pt did stop one treatment due to anxiety but the patient has not complained to her about her anxiety and has been seeing a counselor for about three years now and is on no medication. Patient tells Sydnee (sin) that the Nephrologist has not filled the medication as of yet.  Pts PHQ-9 was completed in 7/24 with a score of 4. Sydnee also states for her no shows the pt has told the charge nurse that it is due to transportation but the patient has never revealed that to Sydnee but if she had Sydnee would of called transportation to try to resolve it.      Scanned in pt's media    Rita Hurtado  Abdominal Transplant MA

## 2025-01-13 NOTE — COMMITTEE REVIEW
Native Organ Dx: HIV Nephropathy      SELECTION COMMITTEE NOTE    Gi To was presented at selection committee on 1/10/25 .  Patient met selection criteria for kidney transplant related to ESRD due to  HIV Nephropathy.  No absolute contraindications to transplant at this time.  Patient will be placed on the cadaveric wait list pending reassessment by transplant nephrology and , Hemoglobin A1C below 9 and final financial approval from insurance company.  Patient will return to clinic for routine appointment in 6 month(s). Patient does not meet criteria for High KDPI kidney offer. Patient does meet HCV+ donor offer. Patient does not meet criteria for dual/enbloc, due to ageT. Planned immunosuppression Thymoglobulin.      Note written by Jennifer Paris RN    ===============================================    I was present at the meeting and attest to the decision of the committee.    Jose Huang  01/13/2025

## 2025-01-15 PROBLEM — E10.59 TYPE 1 DIABETES MELLITUS WITH CIRCULATORY COMPLICATION: Status: ACTIVE | Noted: 2024-01-31

## 2025-01-15 PROBLEM — R07.9 CHEST PAIN: Status: ACTIVE | Noted: 2025-01-15

## 2025-01-17 ENCOUNTER — TELEPHONE (OUTPATIENT)
Dept: TRANSPLANT | Facility: CLINIC | Age: 35
End: 2025-01-17
Payer: MEDICAID

## 2025-06-20 PROBLEM — E66.9 OBESITY (BMI 35.0-39.9 WITHOUT COMORBIDITY): Status: ACTIVE | Noted: 2025-06-20

## 2025-06-20 PROBLEM — Z76.82 KIDNEY TRANSPLANT CANDIDATE: Status: ACTIVE | Noted: 2025-06-20

## 2025-07-03 ENCOUNTER — PATIENT OUTREACH (OUTPATIENT)
Dept: ADMINISTRATIVE | Facility: HOSPITAL | Age: 35
End: 2025-07-03
Payer: MEDICAID

## 2025-07-18 PROBLEM — E66.01 CLASS 2 SEVERE OBESITY WITH SERIOUS COMORBIDITY AND BODY MASS INDEX (BMI) OF 35.0 TO 35.9 IN ADULT: Status: ACTIVE | Noted: 2024-06-22

## 2025-07-18 PROBLEM — E66.812 CLASS 2 SEVERE OBESITY WITH SERIOUS COMORBIDITY AND BODY MASS INDEX (BMI) OF 35.0 TO 35.9 IN ADULT: Status: ACTIVE | Noted: 2024-06-22

## 2025-07-25 ENCOUNTER — TELEPHONE (OUTPATIENT)
Dept: TRANSPLANT | Facility: CLINIC | Age: 35
End: 2025-07-25
Payer: MEDICAID

## 2025-08-15 ENCOUNTER — TELEPHONE (OUTPATIENT)
Dept: TRANSPLANT | Facility: CLINIC | Age: 35
End: 2025-08-15
Payer: MEDICAID

## 2025-09-04 ENCOUNTER — TELEPHONE (OUTPATIENT)
Dept: TRANSPLANT | Facility: CLINIC | Age: 35
End: 2025-09-04
Payer: MEDICAID